# Patient Record
Sex: MALE | Race: WHITE | NOT HISPANIC OR LATINO | Employment: FULL TIME | URBAN - METROPOLITAN AREA
[De-identification: names, ages, dates, MRNs, and addresses within clinical notes are randomized per-mention and may not be internally consistent; named-entity substitution may affect disease eponyms.]

---

## 2017-05-19 ENCOUNTER — ALLSCRIPTS OFFICE VISIT (OUTPATIENT)
Dept: OTHER | Facility: OTHER | Age: 47
End: 2017-05-19

## 2017-05-19 DIAGNOSIS — Z00.00 ENCOUNTER FOR GENERAL ADULT MEDICAL EXAMINATION WITHOUT ABNORMAL FINDINGS: ICD-10-CM

## 2018-01-13 VITALS
HEART RATE: 93 BPM | DIASTOLIC BLOOD PRESSURE: 68 MMHG | OXYGEN SATURATION: 96 % | BODY MASS INDEX: 30.66 KG/M2 | HEIGHT: 71 IN | SYSTOLIC BLOOD PRESSURE: 114 MMHG | WEIGHT: 219 LBS | TEMPERATURE: 98.3 F

## 2018-01-13 NOTE — PROGRESS NOTES
Plan  Health Maintenance    · (1) BASIC METABOLIC PROFILE; Status:Active; Requested for:19May2017;    · (1) CBC/PLT/DIFF; Status:Active; Requested for:19May2017;    · (1) HEPATIC FUNCTION PANEL; Status:Active; Requested for:19May2017;    · (1) LIPID PANEL FASTING W DIRECT LDL REFLEX; Status:Active; Requested  for:19May2017;    · (1) URINALYSIS (will reflex a microscopy if leukocytes, occult blood, protein or nitrites  are not within normal limits); Status:Active; Requested for:19May2017;   Laceration of right hand without foreign body, initial encounter    · Tdap (Adacel)    Discussion/Summary  Impression: health maintenance visit  Currently, he eats a healthy diet  Prostate cancer screening: PSA is not indicated  Screening lab work includes glucose, lipid profile and urinalysis  The immunizations are needed  Patient discussion: discussed with the patient  T Tdap to the laceration history  Screening labs including cholesterol  Follow-up yearly  The patient was counseled regarding  Possible side effects of new medications were reviewed with the patient/guardian today  The treatment plan was reviewed with the patient/guardian  The patient/guardian understands and agrees with the treatment plan      History of Present Illness  HM, Adult Male: The patient is being seen for a health maintenance evaluation  The last health maintenance visit was 1 year(s) ago  Social History: Household members include spouse and 2 son(s)  He is   Work status: working full time  The patient is a current cigarette smoker  He reports occasional alcohol use  The patient has no concerns about alcohol abuse  He has never used illicit drugs  General Health:   Lifestyle:  He does not have a healthy diet  He does not have any weight concerns  He does not exercise regularly  He uses tobacco  He consumes alcohol  He denies drug use  Screening: cancer screening reviewed and updated  metabolic screening reviewed and updated  risk screening reviewed and updated  HPI: A 67-year-old for yearly checkup  He feels well  He does get frequent lacerations at his job and T Tdap should be done  Review of Systems    Over the past 2 weeks, how often have you been bothered by the following problems? 1 ) Little interest or pleasure in doing things? Not at all    2 ) Feeling down, depressed or hopeless? Not at all    3 ) Trouble falling asleep or sleeping too much? Not at all    4 ) Feeling tired or having little energy? Not at all    5 ) Poor appetite or overeating? Not at all    6 ) Feeling bad about yourself, or that you are a failure, or have let yourself or your family down? Not at all    7 ) Trouble concentrating on things, such as reading a newspaper or watching television? Not at all    8 ) Moving or speaking so slowly that other people could have noticed, or the opposite, moving or speaking faster than usual? Not at all    9 ) Thoughts that you would be better off dead or of hurting yourself in some way? Not at all  Score 0      Active Problems    1  Allergic rhinitis, seasonal (477 9) (J30 2)   2  Herpes genitalis in men (054 10) (A60 02)   3  Smoking addiction (305 1) (F17 200)    Family History  Mother    · Family history of High cholesterol (272 0) (E78 00)   · Family history of Osteoporosis (733 00) (M81 0)   · Family history of Thyroid disease (246 9) (E07 9)  Father    · Family history of Stroke (434 91) (I63 9)    Social History    · Cigarette smoker (305 1) (F17 210)   · Employed as    · Heterosexual   · Minimum alcohol consumption   · No drug use   · Smoking addiction (305 1) (F17 200)    Current Meds   1  Allegra Allergy 60 MG Oral Tablet Recorded    Allergies    1   No Known Drug Allergies    Vitals   Recorded: [id number] 03:30PM   Temperature 98 3 F   Heart Rate 93   Systolic 769   Diastolic 68   Height 5 ft 11 in   Weight 219 lb    BMI Calculated 30 54   BSA Calculated 2 19   O2 Saturation 96     Physical Exam    Constitutional   General appearance: No acute distress, well appearing and well nourished  Pulmonary   Respiratory effort: No increased work of breathing or signs of respiratory distress  Auscultation of lungs: Clear to auscultation  Cardiovascular   Auscultation of heart: Normal rate and rhythm, normal S1 and S2, without murmurs  Examination of extremities for edema and/or varicosities: Normal     Lymphatic   Palpation of lymph nodes in neck: No lymphadenopathy  Musculoskeletal   Gait and station: Normal     Skin   Skin and subcutaneous tissue: Normal without rashes or lesions  Neurologic   Reflexes: 2+ and symmetric  Sensation: No sensory loss  Psychiatric   Orientation to person, place and time: Normal     Mood and affect: Normal        Future Appointments    Date/Time Provider Specialty Site   05/21/2018 08:00 AM GO Hanson   Internal [address] [place] [address]     Signatures   Electronically signed by : GO Cantrell ; May 20 2017  1:34PM EST                       (Author)

## 2018-01-15 NOTE — PROGRESS NOTES
Assessment    1  Allergic rhinitis, seasonal (477 9) (J30 2)   2  Herpes genitalis in men (054 10) (A60 9)   3  Cigarette smoker (305 1) (F17 210)   4  Employed as    5  Heterosexual   6  Minimum alcohol consumption   7  No drug use    Plan  Health Maintenance    · (1) CBC/ PLT (NO DIFF); Status:Active; Requested for:15Jan2016;    · (1) COMPREHENSIVE METABOLIC PANEL; Status:Active; Requested for:15Jan2016;    · (1) LIPID PANEL FASTING W DIRECT LDL REFLEX; Status:Active; Requested  for:15Jan2016;    · (1) LYME ANTIBODY PROFILE W/REFLEX TO WESTERN BLOT; Status:Active; Requested  for:15Jan2016;    · (1) URINALYSIS (will reflex a microscopy if leukocytes, occult blood, protein or nitrites are  not within normal limits); Status:Active; Requested for:15Jan2016; Herpes genitalis in men    · ValACYclovir HCl - 500 MG Oral Tablet; TAKE 1 TABLET DAILY   · (1) CHRONIC HEPATITIS PANEL; Status:Active; Requested for:15Jan2016;    · (1) HIV AG/AB COMBO, 4TH GEN; [Do Not Release]; Status:Active; Requested  for:15Jan2016;    · (1) RPR; Status:Active; Requested [id number];     Discussion/Summary  Discussion Summary: You appear to have recurrent/chronic genital herpes  The appropriate treatment his valacyclovir 500 mg daily for suppression  Testing that should be done should be for blood count, kidney function, liver function,  Additionally, due to the herpes infection, you should be checked for other STDs including hepatitis, HIV, and syphilis  Followup yearly  Nonsmoking is recommended  Counseling Documentation With Imm: The patient was counseled regarding instructions for management  Smoking Cessation: Impression: tobacco smoking  Treatment plan includes tobacco cessation counseling  Patient discussion: discussed with the patient  History of Present Illness  HPI: Primary care visit  The patient has had recurrent genital herpes for 4 years   The initial presentation or warts and had cryosurgery but now he has recurrent crops of small ulcerations  These have become chronic to the point where they're always present; he's never been treated with anything other than the initial procedure  No history of other STDs  Lifetime number of sexual partners 15, all women  To the best of his knowledge none IVDU    Seasonal allergies treated intermittently  He would like routine laboratory testing and also a Lyme disease test because she's had numerous tick bites  No active health problems  No surgeries  Benign family history with no history of premature malignancy  He does smoke 1/2-1/3 pack of cigarettes a day  Genital Herpes Simplex (Brief): The patient is being seen for an initial evaluation of genital herpes simplex  The patient presents with complaints of constant episodes of mild genital skin lesions  Episodes started about 4 years ago  Symptoms are unchanged  No associated symptoms are reported  The patient is not currently being treated for this problem  Genital herpes outbreaks first began 4 year(s) ago  Pertinent medical history:  no oral herpes, no herpes zoster, no hepatitis, no syphilis, no chancroid, no chlamydia, no gonorrhea, no HIV infection, no neoplasm and no impaired immunity  He is sexually active  For contraception, he uses condoms  He was previously evaluated by a colleague 4 year(s) ago  Previous presentation included genital lesions and warts  Past treatment has included cryosurgery  Rhinitis (Brief): The patient is being seen for a routine clinic follow-up of rhinitis  Symptoms include:  nasal congestion  Specific environmental allergens have not been identified  No associated symptoms are reported  Current treatment includes nonsedating antihistamines  By report, there is good compliance with treatment, good tolerance of treatment and fair symptom control  Review of Systems  Complete-Male:   Constitutional: not feeling poorly and not feeling tired     Eyes: no purulent discharge from the eyes  ENT: nasal discharge  Cardiovascular: no chest pain  Respiratory: no cough and no shortness of breath during exertion  Gastrointestinal: no abdominal pain, no nausea, no vomiting and no diarrhea  Genitourinary: nocturia, but no dysuria  Musculoskeletal: no arthralgias and no joint swelling  Integumentary: as noted in HPI  Neurological: no headache and no fainting  Psychiatric: no anxiety and no depression  Hematologic/Lymphatic: no swollen glands, no tendency for easy bleeding and no tendency for easy bruising  Family History    1  Family history of High cholesterol (272 0) (E78 0)   2  Family history of Osteoporosis (733 00) (M81 0)   3  Family history of Thyroid disease (246 9) (E07 9)    4  Family history of Stroke (434 91) (I63 9)    Social History    · Cigarette smoker (305 1) (F17 210)   · Employed as    · Heterosexual   · Minimum alcohol consumption   · No drug use    Current Meds   1  Allegra Allergy 60 MG Oral Tablet Recorded    Allergies    1  No Known Drug Allergies    Vitals  Signs [Data Includes: Current Encounter]   Recorded: [id number] 04:59PM   Heart Rate: 78  Respiration: 12  Systolic: 295  Diastolic: 76  Height: 5 ft 11 in  Weight: 210 lb 4 00 oz  BMI Calculated: 29 32  BSA Calculated: 2 15    Physical Exam    Constitutional   General appearance: No acute distress, well appearing and well nourished  Eyes   Conjunctiva and lids: No erythema, swelling or discharge  Pupils and irises: Equal, round, reactive to light  Ophthalmoscopic examination: Normal fundi and optic discs  Ears, Nose, Mouth, and Throat   External inspection of ears and nose: Normal     Hearing: Normal     Nasal mucosa, septum, and turbinates: Normal without edema or erythema  Lips, teeth, and gums: Normal, good dentition  Oropharynx: Normal with no erythema, edema, exudate or lesions  Neck   Neck: Supple, symmetric, trachea midline, no masses      Thyroid: Normal, no thyromegaly  Pulmonary   Respiratory effort: No increased work of breathing or signs of respiratory distress  Percussion of chest: Normal     Palpation of chest: Normal     Auscultation of lungs: Clear to auscultation  Cardiovascular   Palpation of heart: Normal PMI, no thrills  Auscultation of heart: Normal rate and rhythm, normal S1 and S2, no murmurs  Carotid pulses: 2+ bilaterally  Abdominal aorta: Normal     Femoral pulses: 2+ bilaterally  Pedal pulses: 2+ bilaterally  Examination of extremities for edema and/or varicosities: Normal     Chest   Breasts: Normal, no dimpling or skin changes appreciated  Palpation of breasts and axillae: Normal, no masses palpated  Chest: Normal     Abdomen   Abdomen: Non-tender, no masses  Liver and spleen: No hepatomegaly or splenomegaly  Examination for hernias: No hernias appreciated  Genitourinary   Scrotal contents: Normal testes, no masses  Penis: Normal, no lesions  Lymphatic   Palpation of lymph nodes in neck: No lymphadenopathy  Palpation of lymph nodes in axillae: No lymphadenopathy  Palpation of lymph nodes in groin: No lymphadenopathy  Palpation of lymph nodes in other areas: No lymphadenopathy  Musculoskeletal   Gait and station: Normal     Inspection/palpation of digits and nails: Normal without clubbing or cyanosis  Inspection/palpation of joints, bones, and muscles: Normal     Range of motion: Normal     Stability: Normal     Muscle strength/tone: Normal     Skin   Skin and subcutaneous tissue: Normal without rashes or lesions  Examination of the skin for lesions: Abnormal   Scattered small 1 mm shallow ulcerations in the pudendal region and scrotum  Palpation of skin and subcutaneous tissue: Normal turgor  Neurologic   Cranial nerves: Cranial nerves 2-12 intact  Reflexes: 2+ and symmetric  Sensation: No sensory loss      Psychiatric   Judgment and insight: Normal     Orientation to person, place and time: Normal     Recent and remote memory: Intact      Mood and affect: Normal        Signatures   Electronically signed by : GO Apple ; Stanley 15 2016  5:38PM EST                       (Author)

## 2018-01-16 NOTE — MISCELLANEOUS
Dear  Alton Mitchell:      We missed you for your originally scheduled Neurological Consultation  Please call at your earliest convenience to reschedule this appointment      Sincerely,    Ivan Record [address]           Electronically signed by:Eden Webster   Oct  6 2016  4:30PM EST Co-author

## 2018-05-14 RX ORDER — FEXOFENADINE HYDROCHLORIDE 60 MG/1
TABLET, FILM COATED ORAL
COMMUNITY
End: 2018-05-15 | Stop reason: CLARIF

## 2018-05-15 ENCOUNTER — OFFICE VISIT (OUTPATIENT)
Dept: INTERNAL MEDICINE CLINIC | Facility: CLINIC | Age: 48
End: 2018-05-15
Payer: COMMERCIAL

## 2018-05-15 VITALS
DIASTOLIC BLOOD PRESSURE: 72 MMHG | OXYGEN SATURATION: 98 % | HEIGHT: 72 IN | SYSTOLIC BLOOD PRESSURE: 110 MMHG | WEIGHT: 215 LBS | HEART RATE: 88 BPM | BODY MASS INDEX: 29.12 KG/M2
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
## 2018-05-15 DIAGNOSIS — Z00.00 HEALTHCARE MAINTENANCE: Primary | ICD-10-CM

## 2018-05-15 PROCEDURE — 99396 PREV VISIT EST AGE 40-64: CPT | Performed by: INTERNAL MEDICINE

## 2018-05-15 RX ORDER — CETIRIZINE HYDROCHLORIDE 10 MG/1
10 TABLET ORAL DAILY
COMMUNITY

## 2018-05-15 NOTE — PROGRESS NOTES
Assessment/Plan:       There are no diagnoses linked to this encounter  Subjective:      Patient ID: Heidy Rick is a 51 y o  male  A 58-year-old man with no diagnoses presents for health maintenance visit  He is a nonsmoker, drinks rare alcohol, uses no illicit drugs  , sexually active with 1 partner  Reports of a bit of intermittent erectile dysfunction consistent with age but nothing too severe right now  He would like an exam for Lyme titer due to multiple tick bites  However, globally, he feels well  The following portions of the patient's history were reviewed and updated as appropriate:   He has no past medical history on file  ,   does not have a problem list on file  ,   has no past surgical history on file  ,  family history includes Hyperlipidemia in his mother; Osteoporosis in his mother; Stroke in his father; Thyroid disease in his mother  ,   reports that he has been smoking  He has never used smokeless tobacco  He reports that he drinks alcohol  He reports that he does not use drugs  ,  has No Known Allergies     Current Outpatient Prescriptions   Medication Sig Dispense Refill    cetirizine (ZyrTEC) 10 mg tablet Take 10 mg by mouth daily       No current facility-administered medications for this visit  Review of Systems   Constitutional: Negative for chills and fever  HENT: Negative for sore throat and trouble swallowing  Eyes: Negative for pain  Respiratory: Negative for cough and shortness of breath  Cardiovascular: Negative for chest pain and palpitations  Gastrointestinal: Negative for abdominal pain, blood in stool, diarrhea, nausea and vomiting  Endocrine: Negative for cold intolerance and heat intolerance  Genitourinary: Negative for dysuria, frequency and testicular pain  Musculoskeletal: Negative for joint swelling  Allergic/Immunologic: Negative for immunocompromised state     Neurological: Negative for dizziness, syncope and headaches  Hematological: Negative for adenopathy  Does not bruise/bleed easily  Psychiatric/Behavioral: Negative for dysphoric mood  The patient is not nervous/anxious  Objective:  Vitals:    05/15/18 1502   BP: 110/72   Pulse: 88   SpO2: 98%      Physical Exam   Constitutional: He is oriented to person, place, and time  He appears well-developed and well-nourished  HENT:   Head: Normocephalic and atraumatic  Eyes: Conjunctivae are normal  Pupils are equal, round, and reactive to light  Neck: Normal range of motion  No thyromegaly present  Cardiovascular: Normal rate, regular rhythm and normal heart sounds  No murmur heard  Pulmonary/Chest: Effort normal  No respiratory distress  He has no wheezes  He has no rales  Abdominal: Soft  There is no tenderness  Genitourinary: Penis normal  Right testis shows no mass and no tenderness  Left testis shows no mass and no tenderness  Musculoskeletal: Normal range of motion  He exhibits no deformity  Lymphadenopathy:     He has no cervical adenopathy  Neurological: He is alert and oriented to person, place, and time  Skin: Skin is warm and dry  Psychiatric: He has a normal mood and affect   His behavior is normal  Judgment and thought content normal

## 2018-05-18 ENCOUNTER — APPOINTMENT (OUTPATIENT)
Dept: LAB | Facility: HOSPITAL | Age: 48
End: 2018-05-18
Attending: INTERNAL MEDICINE
Payer: COMMERCIAL

## 2018-05-18 DIAGNOSIS — Z00.00 HEALTHCARE MAINTENANCE: ICD-10-CM
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
## 2018-05-18 LAB
ALBUMIN SERPL BCP-MCNC: 3.7 G/DL (ref 3.5–5)
ALP SERPL-CCNC: 69 U/L (ref 46–116)
ALT SERPL W P-5'-P-CCNC: 36 U/L (ref 12–78)
ANION GAP SERPL CALCULATED.3IONS-SCNC: 4 MMOL/L (ref 4–13)
AST SERPL W P-5'-P-CCNC: 20 U/L (ref 5–45)
BASOPHILS # BLD AUTO: 0.07 THOUSANDS/ΜL (ref 0–0.1)
BASOPHILS NFR BLD AUTO: 1 % (ref 0–1)
BILIRUB SERPL-MCNC: 0.5 MG/DL (ref 0.2–1)
BILIRUB UR QL STRIP: NEGATIVE
BUN SERPL-MCNC: 13 MG/DL (ref 5–25)
CALCIUM SERPL-MCNC: 9.1 MG/DL (ref 8.3–10.1)
CHLORIDE SERPL-SCNC: 106 MMOL/L (ref 100–108)
CHOLEST SERPL-MCNC: 204 MG/DL (ref 50–200)
CLARITY UR: CLEAR
CO2 SERPL-SCNC: 30 MMOL/L (ref 21–32)
COLOR UR: YELLOW
CREAT SERPL-MCNC: 1.01 MG/DL (ref 0.6–1.3)
EOSINOPHIL # BLD AUTO: 0.25 THOUSAND/ΜL (ref 0–0.61)
EOSINOPHIL NFR BLD AUTO: 4 % (ref 0–6)
ERYTHROCYTE [DISTWIDTH] IN BLOOD BY AUTOMATED COUNT: 12.4 % (ref 11.6–15.1)
GFR SERPL CREATININE-BSD FRML MDRD: 87 ML/MIN/1.73SQ M
GLUCOSE P FAST SERPL-MCNC: 95 MG/DL (ref 65–99)
GLUCOSE UR STRIP-MCNC: NEGATIVE MG/DL
HCT VFR BLD AUTO: 43.5 % (ref 36.5–49.3)
HDLC SERPL-MCNC: 38 MG/DL (ref 40–60)
HGB BLD-MCNC: 14.5 G/DL (ref 12–17)
HGB UR QL STRIP.AUTO: NEGATIVE
IMM GRANULOCYTES # BLD AUTO: 0.02 THOUSAND/UL (ref 0–0.2)
IMM GRANULOCYTES NFR BLD AUTO: 0 % (ref 0–2)
KETONES UR STRIP-MCNC: NEGATIVE MG/DL
LDLC SERPL CALC-MCNC: 152 MG/DL (ref 0–100)
LEUKOCYTE ESTERASE UR QL STRIP: NEGATIVE
LYMPHOCYTES # BLD AUTO: 2.35 THOUSANDS/ΜL (ref 0.6–4.47)
LYMPHOCYTES NFR BLD AUTO: 34 % (ref 14–44)
MCH RBC QN AUTO: 28 PG (ref 26.8–34.3)
MCHC RBC AUTO-ENTMCNC: 33.3 G/DL (ref 31.4–37.4)
MCV RBC AUTO: 84 FL (ref 82–98)
MONOCYTES # BLD AUTO: 0.63 THOUSAND/ΜL (ref 0.17–1.22)
MONOCYTES NFR BLD AUTO: 9 % (ref 4–12)
NEUTROPHILS # BLD AUTO: 3.63 THOUSANDS/ΜL (ref 1.85–7.62)
NEUTS SEG NFR BLD AUTO: 52 % (ref 43–75)
NITRITE UR QL STRIP: NEGATIVE
NRBC BLD AUTO-RTO: 0 /100 WBCS
PH UR STRIP.AUTO: 6 [PH] (ref 4.5–8)
PLATELET # BLD AUTO: 208 THOUSANDS/UL (ref 149–390)
PMV BLD AUTO: 10 FL (ref 8.9–12.7)
POTASSIUM SERPL-SCNC: 4.2 MMOL/L (ref 3.5–5.3)
PROT SERPL-MCNC: 7.3 G/DL (ref 6.4–8.2)
PROT UR STRIP-MCNC: NEGATIVE MG/DL
RBC # BLD AUTO: 5.18 MILLION/UL (ref 3.88–5.62)
SODIUM SERPL-SCNC: 140 MMOL/L (ref 136–145)
SP GR UR STRIP.AUTO: 1.02 (ref 1–1.03)
T4 FREE SERPL-MCNC: 0.91 NG/DL (ref 0.76–1.46)
TRIGL SERPL-MCNC: 71 MG/DL
TSH SERPL DL<=0.05 MIU/L-ACNC: 4.8 UIU/ML (ref 0.36–3.74)
UROBILINOGEN UR QL STRIP.AUTO: 0.2 E.U./DL
WBC # BLD AUTO: 6.95 THOUSAND/UL (ref 4.31–10.16)

## 2018-05-18 PROCEDURE — 36415 COLL VENOUS BLD VENIPUNCTURE: CPT

## 2018-05-18 PROCEDURE — 80053 COMPREHEN METABOLIC PANEL: CPT

## 2018-05-18 PROCEDURE — 81003 URINALYSIS AUTO W/O SCOPE: CPT | Performed by: INTERNAL MEDICINE

## 2018-05-18 PROCEDURE — 86618 LYME DISEASE ANTIBODY: CPT

## 2018-05-18 PROCEDURE — 85025 COMPLETE CBC W/AUTO DIFF WBC: CPT

## 2018-05-18 PROCEDURE — 80061 LIPID PANEL: CPT

## 2018-05-18 PROCEDURE — 84439 ASSAY OF FREE THYROXINE: CPT

## 2018-05-18 PROCEDURE — 84443 ASSAY THYROID STIM HORMONE: CPT

## 2018-05-21 LAB
B BURGDOR IGG SER IA-ACNC: 0.11
B BURGDOR IGM SER IA-ACNC: 0.14

## 2018-07-24 ENCOUNTER — TELEPHONE (OUTPATIENT)
Dept: INTERNAL MEDICINE CLINIC | Facility: CLINIC | Age: 48
End: 2018-07-24

## 2018-07-24 DIAGNOSIS — E78.5 HYPERLIPIDEMIA, UNSPECIFIED HYPERLIPIDEMIA TYPE: Primary | ICD-10-CM

## 2018-07-24 RX ORDER — ATORVASTATIN CALCIUM 20 MG/1
20 TABLET, FILM COATED ORAL DAILY
Qty: 90 TABLET | Refills: 3 | Status: SHIPPED | OUTPATIENT
Start: 2018-07-24 | End: 2020-09-04

## 2018-07-24 NOTE — TELEPHONE ENCOUNTER
PT  CARLIN  HAD  BLOOD WORK DONE  IN MAY   SAW  ON MY CHART  THAT  HIS  LABS  WERE HIGH  SAID  HE  NEVER  GOT  A CALL ABOUT  THEM  ASKING  TO SPEAK WITH DR Dahiana Hamilton PT  [phone]

## 2018-11-20 ENCOUNTER — OFFICE VISIT (OUTPATIENT)
Dept: INTERNAL MEDICINE CLINIC | Facility: CLINIC | Age: 48
End: 2018-11-20
Payer: COMMERCIAL

## 2018-11-20 VITALS
DIASTOLIC BLOOD PRESSURE: 80 MMHG | HEIGHT: 71 IN | HEART RATE: 69 BPM | OXYGEN SATURATION: 98 % | SYSTOLIC BLOOD PRESSURE: 122 MMHG | BODY MASS INDEX: 30.15 KG/M2 | WEIGHT: 215.4 LBS

## 2018-11-20 DIAGNOSIS — E03.8 SUBCLINICAL HYPOTHYROIDISM: Primary | ICD-10-CM

## 2018-11-20 DIAGNOSIS — Z12.11 COLON CANCER SCREENING: ICD-10-CM

## 2018-11-20 DIAGNOSIS — Z00.00 HEALTHCARE MAINTENANCE: ICD-10-CM

## 2018-11-20 PROBLEM — Z72.0 TOBACCO ABUSE: Status: ACTIVE | Noted: 2018-11-20

## 2018-11-20 PROCEDURE — 3008F BODY MASS INDEX DOCD: CPT | Performed by: INTERNAL MEDICINE

## 2018-11-20 PROCEDURE — 99213 OFFICE O/P EST LOW 20 MIN: CPT | Performed by: INTERNAL MEDICINE

## 2018-11-20 NOTE — PATIENT INSTRUCTIONS
The major risks your health is year smoking  Recheck thyroid and lipids  Follow-up for next birthday  Colon screening recommended at the age of 38 now so I will refer you to Dr Margarita Reddy

## 2018-11-20 NOTE — PROGRESS NOTES
Assessment/Plan:       Diagnoses and all orders for this visit:    Subclinical hypothyroidism  -     T3, free; Future  -     TSH, 3rd generation with Free T4 reflex; Future  -     T4, free; Future  -     Thyroid Antibodies Panel; Future  -     Lipid Panel with Direct LDL reflex; Future    Colon cancer screening  -     Ambulatory referral to Gastroenterology; Future    Healthcare maintenance          There are no Patient Instructions on file for this visit  Subjective:      Patient ID: Chip Thacker is a 51 y o  male  An asymptomatic 24-year-old male is concerned about his TSH  Value was 4 7  He has no symptoms or signs to suggest hypothyroidism  Likewise, lipids were modestly elevated  Using the [address] puts his 10 year risk at about 5-6%  Smokes about a quarter pack a day  The following portions of the patient's history were reviewed and updated as appropriate:   He has no past medical history on file  ,   does not have any pertinent problems on file  ,   has no past surgical history on file  ,  family history includes Hyperlipidemia in his mother; Osteoporosis in his mother; Stroke in his father; Thyroid disease in his mother  ,   reports that he has been smoking  He has a 15 00 pack-year smoking history  He has never used smokeless tobacco  He reports that he drinks alcohol  He reports that he does not use drugs  ,  has No Known Allergies     Current Outpatient Prescriptions   Medication Sig Dispense Refill    atorvastatin (LIPITOR) 20 mg tablet Take 1 tablet (20 mg total) by mouth daily for 90 days 90 tablet 3    cetirizine (ZyrTEC) 10 mg tablet Take 10 mg by mouth daily       No current facility-administered medications for this visit  Review of Systems   Constitutional: Negative for fatigue  Respiratory: Negative for cough, shortness of breath and wheezing  Endocrine: Negative for cold intolerance and heat intolerance     Musculoskeletal: Negative for joint swelling  Psychiatric/Behavioral: Negative for decreased concentration  Objective:  Vitals:    11/20/18 1251   BP: 122/80   Pulse: 69   SpO2: 98%      Physical Exam   Constitutional: He appears well-developed and well-nourished  No distress  Cardiovascular: Normal rate  Pulmonary/Chest: Effort normal    Neurological: He displays normal reflexes  Psychiatric: He has a normal mood and affect   Judgment normal

## 2020-09-02 ENCOUNTER — TELEPHONE (OUTPATIENT)
Dept: INTERNAL MEDICINE CLINIC | Facility: CLINIC | Age: 50
End: 2020-09-02

## 2020-09-03 RX ORDER — IBUPROFEN 200 MG
TABLET ORAL EVERY 6 HOURS PRN
COMMUNITY

## 2020-09-04 ENCOUNTER — OFFICE VISIT (OUTPATIENT)
Dept: INTERNAL MEDICINE CLINIC | Facility: CLINIC | Age: 50
End: 2020-09-04
Payer: COMMERCIAL

## 2020-09-04 VITALS
OXYGEN SATURATION: 97 % | DIASTOLIC BLOOD PRESSURE: 60 MMHG | HEIGHT: 74 IN | BODY MASS INDEX: 27.44 KG/M2 | WEIGHT: 213.8 LBS | SYSTOLIC BLOOD PRESSURE: 106 MMHG | TEMPERATURE: 97.6 F | HEART RATE: 74 BPM

## 2020-09-04 DIAGNOSIS — E78.5 HYPERLIPIDEMIA, UNSPECIFIED HYPERLIPIDEMIA TYPE: ICD-10-CM

## 2020-09-04 DIAGNOSIS — Z72.0 TOBACCO ABUSE: Primary | ICD-10-CM

## 2020-09-04 DIAGNOSIS — Z12.11 SCREENING FOR COLON CANCER: ICD-10-CM

## 2020-09-04 DIAGNOSIS — Z00.00 HEALTHCARE MAINTENANCE: ICD-10-CM

## 2020-09-04 PROCEDURE — 99396 PREV VISIT EST AGE 40-64: CPT | Performed by: PHYSICIAN ASSISTANT

## 2020-09-04 NOTE — PROGRESS NOTES
Assessment/Plan: for annual follow-up he feels well has no complaints  I urged him to quit smoking  Screening labs as below       Diagnoses and all orders for this visit:    Tobacco abuse    Hyperlipidemia, unspecified hyperlipidemia type  -     CBC and differential; Future  -     Comprehensive metabolic panel; Future  -     Hemoglobin A1C; Future  -     Lipid panel; Future  -     UA w Reflex to Microscopic w Reflex to Culture  -     TSH, 3rd generation; Future  -     PSA, Total Screen; Future  -     HIV 1/2 Antigen/Antibody (4th Generation) w Reflex [facility]; Future  -     Hepatitis C antibody; Future  -     Ambulatory referral to Gastroenterology; Future    Healthcare maintenance  -     CBC and differential; Future  -     Comprehensive metabolic panel; Future  -     Hemoglobin A1C; Future  -     Lipid panel; Future  -     UA w Reflex to Microscopic w Reflex to Culture  -     TSH, 3rd generation; Future  -     PSA, Total Screen; Future  -     HIV 1/2 Antigen/Antibody (4th Generation) w Reflex [facility]; Future  -     Hepatitis C antibody; Future  -     Ambulatory referral to Gastroenterology; Future    Screening for colon cancer  -     Ambulatory referral to Gastroenterology; Future    Other orders  -     ibuprofen (MOTRIN) 200 mg tablet; Take by mouth every 6 (six) hours as needed for mild pain        No problem-specific Assessment & Plan notes found for this encounter  BMI Counseling: Body mass index is 27 45 kg/m²  The BMI is above normal  Nutrition recommendations include decreasing portion sizes  Exercise recommendations include exercising 3-5 times per week  Tobacco Cessation Counseling: Tobacco cessation counseling was provided  The patient is sincerely urged to quit consumption of tobacco  He is ready to quit tobacco  Medication options discussed  Subjective:      Patient ID: Vanesa Mcghee is a 45 y o  male  He is here for  Medical follow-up    He feels well has no complaints 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  A statin has been prescribed for him in the past but he never took it  A cigarette smoker he has tried to quit many times      The following portions of the patient's history were reviewed and updated as appropriate:   He has no past medical history on file  ,  does not have any pertinent problems on file  ,   has no past surgical history on file  ,  family history includes Hyperlipidemia in his mother; Osteoporosis in his mother; Stroke in his father; Thyroid disease in his mother  ,   reports that he has been smoking  He started smoking about 33 years ago  He has a 15 00 pack-year smoking history  He has never used smokeless tobacco  He reports current alcohol use  He reports that he does not use drugs  ,  has No Known Allergies     Current Outpatient Medications   Medication Sig Dispense Refill    cetirizine (ZyrTEC) 10 mg tablet Take 10 mg by mouth daily      ibuprofen (MOTRIN) 200 mg tablet Take by mouth every 6 (six) hours as needed for mild pain       No current facility-administered medications for this visit  Review of Systems   Constitutional: Negative for activity change, appetite change, chills, diaphoresis, fatigue, fever and unexpected weight change  HENT: Negative for congestion, dental problem, drooling, ear discharge, ear pain, facial swelling, hearing loss, nosebleeds, postnasal drip, rhinorrhea, sinus pressure, sinus pain, sneezing, sore throat, tinnitus, trouble swallowing and voice change  Eyes: Negative for photophobia, pain, discharge, redness, itching and visual disturbance  Respiratory: Negative for apnea, cough, choking, chest tightness, shortness of breath, wheezing and stridor  Cardiovascular: Negative for chest pain, palpitations and leg swelling  Gastrointestinal: Negative for abdominal distention, abdominal pain, anal bleeding, blood in stool, constipation, diarrhea, nausea, rectal pain and vomiting     Endocrine: Negative for cold intolerance, heat intolerance, polydipsia, polyphagia and polyuria  Genitourinary: Negative for decreased urine volume, difficulty urinating, dysuria, enuresis, flank pain, frequency, genital sores, hematuria and urgency  Musculoskeletal: Negative for arthralgias, back pain, gait problem, joint swelling, myalgias, neck pain and neck stiffness  Skin: Negative for color change, pallor, rash and wound  Neurological: Negative for dizziness, tremors, seizures, syncope, facial asymmetry, speech difficulty, weakness, light-headedness, numbness and headaches  Hematological: Negative for adenopathy  Does not bruise/bleed easily  Psychiatric/Behavioral: Negative for agitation, behavioral problems, confusion, decreased concentration, dysphoric mood, hallucinations, self-injury, sleep disturbance and suicidal ideas  The patient is not nervous/anxious and is not hyperactive  Objective:  Vitals:    09/04/20 1519   BP: 106/60   BP Location: Left arm   Patient Position: Sitting   Cuff Size: Standard   Pulse: 74   Temp: 97 6 °F (36 4 °C)   SpO2: 97%   Weight: 97 kg (213 lb 12 8 oz)   Height: 6' 2" (1 88 m)     Body mass index is 27 45 kg/m²  Physical Exam  Vitals signs reviewed  Constitutional:       Appearance: Normal appearance  He is well-developed  HENT:      Head: Normocephalic  Right Ear: Tympanic membrane and external ear normal       Left Ear: Tympanic membrane and external ear normal       Nose: Nose normal       Mouth/Throat:      Mouth: Mucous membranes are moist    Eyes:      Conjunctiva/sclera: Conjunctivae normal       Pupils: Pupils are equal, round, and reactive to light  Neck:      Musculoskeletal: Normal range of motion and neck supple  Thyroid: No thyromegaly  Vascular: No carotid bruit  Cardiovascular:      Rate and Rhythm: Normal rate and regular rhythm  Pulses: Normal pulses  Heart sounds: Normal heart sounds  No murmur     Pulmonary:      Effort: Pulmonary effort is normal  No respiratory distress  Breath sounds: Normal breath sounds  No wheezing or rales  Abdominal:      General: Abdomen is flat  Bowel sounds are normal  There is no distension  Palpations: Abdomen is soft  There is no mass  Tenderness: There is no abdominal tenderness  There is no guarding or rebound  Hernia: No hernia is present  Musculoskeletal: Normal range of motion  General: No swelling  Lymphadenopathy:      Cervical: No cervical adenopathy  Skin:     General: Skin is warm and dry  Neurological:      General: No focal deficit present  Mental Status: He is alert and oriented to person, place, and time  Cranial Nerves: No cranial nerve deficit  Sensory: No sensory deficit  Motor: No weakness  Coordination: Coordination normal       Gait: Gait normal       Deep Tendon Reflexes: Reflexes are normal and symmetric  Reflexes normal    Psychiatric:         Mood and Affect: Mood normal          Thought Content:  Thought content normal          Judgment: Judgment normal

## 2020-09-12 ENCOUNTER — APPOINTMENT (OUTPATIENT)
Dept: LAB | Facility: CLINIC | Age: 50
End: 2020-09-12
Payer: COMMERCIAL

## 2020-09-12 DIAGNOSIS — Z00.00 HEALTHCARE MAINTENANCE: ICD-10-CM

## 2020-09-12 DIAGNOSIS — E78.5 HYPERLIPIDEMIA, UNSPECIFIED HYPERLIPIDEMIA TYPE: ICD-10-CM

## 2020-09-12 LAB
ALBUMIN SERPL BCP-MCNC: 3.8 G/DL (ref 3.5–5)
ALP SERPL-CCNC: 65 U/L (ref 46–116)
ALT SERPL W P-5'-P-CCNC: 33 U/L (ref 12–78)
ANION GAP SERPL CALCULATED.3IONS-SCNC: 4 MMOL/L (ref 4–13)
AST SERPL W P-5'-P-CCNC: 18 U/L (ref 5–45)
BASOPHILS # BLD AUTO: 0.08 THOUSANDS/ΜL (ref 0–0.1)
BASOPHILS NFR BLD AUTO: 1 % (ref 0–1)
BILIRUB SERPL-MCNC: 0.5 MG/DL (ref 0.2–1)
BILIRUB UR QL STRIP: NEGATIVE
BUN SERPL-MCNC: 18 MG/DL (ref 5–25)
CALCIUM SERPL-MCNC: 9.1 MG/DL (ref 8.3–10.1)
CHLORIDE SERPL-SCNC: 109 MMOL/L (ref 100–108)
CHOLEST SERPL-MCNC: 210 MG/DL (ref 50–200)
CLARITY UR: CLEAR
CO2 SERPL-SCNC: 27 MMOL/L (ref 21–32)
COLOR UR: YELLOW
CREAT SERPL-MCNC: 1.03 MG/DL (ref 0.6–1.3)
EOSINOPHIL # BLD AUTO: 0.26 THOUSAND/ΜL (ref 0–0.61)
EOSINOPHIL NFR BLD AUTO: 4 % (ref 0–6)
ERYTHROCYTE [DISTWIDTH] IN BLOOD BY AUTOMATED COUNT: 12.7 % (ref 11.6–15.1)
EST. AVERAGE GLUCOSE BLD GHB EST-MCNC: 114 MG/DL
GFR SERPL CREATININE-BSD FRML MDRD: 84 ML/MIN/1.73SQ M
GLUCOSE P FAST SERPL-MCNC: 96 MG/DL (ref 65–99)
GLUCOSE UR STRIP-MCNC: NEGATIVE MG/DL
HBA1C MFR BLD: 5.6 %
HCT VFR BLD AUTO: 45.2 % (ref 36.5–49.3)
HCV AB SER QL: NORMAL
HDLC SERPL-MCNC: 36 MG/DL
HGB BLD-MCNC: 15 G/DL (ref 12–17)
HGB UR QL STRIP.AUTO: NEGATIVE
IMM GRANULOCYTES # BLD AUTO: 0.02 THOUSAND/UL (ref 0–0.2)
IMM GRANULOCYTES NFR BLD AUTO: 0 % (ref 0–2)
KETONES UR STRIP-MCNC: NEGATIVE MG/DL
LDLC SERPL CALC-MCNC: 158 MG/DL (ref 0–100)
LEUKOCYTE ESTERASE UR QL STRIP: NEGATIVE
LYMPHOCYTES # BLD AUTO: 2.44 THOUSANDS/ΜL (ref 0.6–4.47)
LYMPHOCYTES NFR BLD AUTO: 36 % (ref 14–44)
MCH RBC QN AUTO: 28.6 PG (ref 26.8–34.3)
MCHC RBC AUTO-ENTMCNC: 33.2 G/DL (ref 31.4–37.4)
MCV RBC AUTO: 86 FL (ref 82–98)
MONOCYTES # BLD AUTO: 0.62 THOUSAND/ΜL (ref 0.17–1.22)
MONOCYTES NFR BLD AUTO: 9 % (ref 4–12)
NEUTROPHILS # BLD AUTO: 3.38 THOUSANDS/ΜL (ref 1.85–7.62)
NEUTS SEG NFR BLD AUTO: 50 % (ref 43–75)
NITRITE UR QL STRIP: NEGATIVE
NONHDLC SERPL-MCNC: 174 MG/DL
NRBC BLD AUTO-RTO: 0 /100 WBCS
PH UR STRIP.AUTO: 7 [PH]
PLATELET # BLD AUTO: 221 THOUSANDS/UL (ref 149–390)
PMV BLD AUTO: 10.4 FL (ref 8.9–12.7)
POTASSIUM SERPL-SCNC: 4.4 MMOL/L (ref 3.5–5.3)
PROT SERPL-MCNC: 7.3 G/DL (ref 6.4–8.2)
PROT UR STRIP-MCNC: NEGATIVE MG/DL
PSA SERPL-MCNC: 1.3 NG/ML (ref 0–4)
RBC # BLD AUTO: 5.24 MILLION/UL (ref 3.88–5.62)
SODIUM SERPL-SCNC: 140 MMOL/L (ref 136–145)
SP GR UR STRIP.AUTO: 1.02 (ref 1–1.03)
TRIGL SERPL-MCNC: 81 MG/DL
TSH SERPL DL<=0.05 MIU/L-ACNC: 4.78 UIU/ML (ref 0.36–3.74)
UROBILINOGEN UR QL STRIP.AUTO: 0.2 E.U./DL
WBC # BLD AUTO: 6.8 THOUSAND/UL (ref 4.31–10.16)

## 2020-09-12 PROCEDURE — 85025 COMPLETE CBC W/AUTO DIFF WBC: CPT

## 2020-09-12 PROCEDURE — 86803 HEPATITIS C AB TEST: CPT

## 2020-09-12 PROCEDURE — 80061 LIPID PANEL: CPT

## 2020-09-12 PROCEDURE — 83036 HEMOGLOBIN GLYCOSYLATED A1C: CPT

## 2020-09-12 PROCEDURE — 87389 HIV-1 AG W/HIV-1&-2 AB AG IA: CPT

## 2020-09-12 PROCEDURE — 36415 COLL VENOUS BLD VENIPUNCTURE: CPT

## 2020-09-12 PROCEDURE — G0103 PSA SCREENING: HCPCS

## 2020-09-12 PROCEDURE — 81003 URINALYSIS AUTO W/O SCOPE: CPT | Performed by: PHYSICIAN ASSISTANT

## 2020-09-12 PROCEDURE — 84443 ASSAY THYROID STIM HORMONE: CPT

## 2020-09-12 PROCEDURE — 80053 COMPREHEN METABOLIC PANEL: CPT

## 2020-09-14 LAB — HIV 1+2 AB+HIV1 P24 AG SERPL QL IA: NORMAL

## 2020-09-22 ENCOUNTER — TELEPHONE (OUTPATIENT)
Dept: INTERNAL MEDICINE CLINIC | Facility: CLINIC | Age: 50
End: 2020-09-22

## 2020-09-22 DIAGNOSIS — E78.5 HYPERLIPIDEMIA, UNSPECIFIED HYPERLIPIDEMIA TYPE: Primary | ICD-10-CM

## 2020-09-22 RX ORDER — ATORVASTATIN CALCIUM 20 MG/1
20 TABLET, FILM COATED ORAL DAILY
Qty: 90 TABLET | Refills: 3 | Status: SHIPPED | OUTPATIENT
Start: 2020-09-22 | End: 2021-09-21

## 2020-09-22 NOTE — TELEPHONE ENCOUNTER
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

## 2020-09-22 NOTE — TELEPHONE ENCOUNTER
Pt informed of Kole message  Pt stated he was never on a cholesterol medication and want to know if one can be sent to his pharmacy?

## 2020-09-22 NOTE — TELEPHONE ENCOUNTER
All of his labs are normal except for his cholesterol which is elevated he should be taking his cholesterol pills please call

## 2020-10-15 ENCOUNTER — OFFICE VISIT (OUTPATIENT)
Dept: GASTROENTEROLOGY | Facility: CLINIC | Age: 50
End: 2020-10-15
Payer: COMMERCIAL

## 2020-10-15 VITALS
BODY MASS INDEX: 27.98 KG/M2 | SYSTOLIC BLOOD PRESSURE: 118 MMHG | DIASTOLIC BLOOD PRESSURE: 80 MMHG | WEIGHT: 218 LBS | TEMPERATURE: 97.2 F | HEIGHT: 74 IN | HEART RATE: 82 BPM

## 2020-10-15 DIAGNOSIS — Z12.11 SCREENING FOR COLON CANCER: ICD-10-CM

## 2020-10-15 DIAGNOSIS — Z00.00 HEALTHCARE MAINTENANCE: ICD-10-CM

## 2020-10-15 DIAGNOSIS — E78.5 HYPERLIPIDEMIA, UNSPECIFIED HYPERLIPIDEMIA TYPE: ICD-10-CM

## 2020-10-15 PROCEDURE — 99203 OFFICE O/P NEW LOW 30 MIN: CPT | Performed by: PHYSICIAN ASSISTANT

## 2020-10-28 ENCOUNTER — ANESTHESIA EVENT (OUTPATIENT)
Dept: GASTROENTEROLOGY | Facility: HOSPITAL | Age: 50
End: 2020-10-28

## 2020-10-28 ENCOUNTER — ANESTHESIA (OUTPATIENT)
Dept: GASTROENTEROLOGY | Facility: HOSPITAL | Age: 50
End: 2020-10-28

## 2020-10-28 ENCOUNTER — HOSPITAL ENCOUNTER (OUTPATIENT)
Dept: GASTROENTEROLOGY | Facility: HOSPITAL | Age: 50
Setting detail: OUTPATIENT SURGERY
Discharge: HOME | End: 2020-10-28
Attending: INTERNAL MEDICINE | Admitting: INTERNAL MEDICINE
Payer: COMMERCIAL

## 2020-10-28 VITALS
DIASTOLIC BLOOD PRESSURE: 72 MMHG | RESPIRATION RATE: 11 BRPM | TEMPERATURE: 97.6 F | WEIGHT: 212.96 LBS | HEIGHT: 74 IN | HEART RATE: 76 BPM | SYSTOLIC BLOOD PRESSURE: 112 MMHG | BODY MASS INDEX: 27.33 KG/M2 | OXYGEN SATURATION: 100 %

## 2020-10-28 VITALS — HEART RATE: 85 BPM

## 2020-10-28 DIAGNOSIS — Z12.11 SCREENING FOR COLON CANCER: ICD-10-CM

## 2020-10-28 PROCEDURE — 88305 TISSUE EXAM BY PATHOLOGIST: CPT | Performed by: PATHOLOGY

## 2020-10-28 PROCEDURE — 45385 COLONOSCOPY W/LESION REMOVAL: CPT | Performed by: INTERNAL MEDICINE

## 2020-10-28 RX ORDER — SODIUM CHLORIDE, SODIUM LACTATE, POTASSIUM CHLORIDE, CALCIUM CHLORIDE 600; 310; 30; 20 MG/100ML; MG/100ML; MG/100ML; MG/100ML
125 INJECTION, SOLUTION INTRAVENOUS CONTINUOUS
Status: DISCONTINUED | OUTPATIENT
Start: 2020-10-28 | End: 2020-11-01 | Stop reason: HOSPADM

## 2020-10-28 RX ORDER — PROPOFOL 10 MG/ML
INJECTION, EMULSION INTRAVENOUS AS NEEDED
Status: DISCONTINUED | OUTPATIENT
Start: 2020-10-28 | End: 2020-10-28

## 2020-10-28 RX ADMIN — PROPOFOL 30 MG: 10 INJECTION, EMULSION INTRAVENOUS at 09:49

## 2020-10-28 RX ADMIN — PROPOFOL 20 MG: 10 INJECTION, EMULSION INTRAVENOUS at 09:56

## 2020-10-28 RX ADMIN — SODIUM CHLORIDE, SODIUM LACTATE, POTASSIUM CHLORIDE, AND CALCIUM CHLORIDE 125 ML/HR: .6; .31; .03; .02 INJECTION, SOLUTION INTRAVENOUS at 09:11

## 2020-10-28 RX ADMIN — PROPOFOL 30 MG: 10 INJECTION, EMULSION INTRAVENOUS at 09:53

## 2020-10-28 RX ADMIN — PROPOFOL 100 MG: 10 INJECTION, EMULSION INTRAVENOUS at 09:46

## 2021-01-15 ENCOUNTER — TELEMEDICINE (OUTPATIENT)
Dept: INTERNAL MEDICINE CLINIC | Facility: CLINIC | Age: 51
End: 2021-01-15
Payer: COMMERCIAL

## 2021-01-15 DIAGNOSIS — B34.9 VIRAL INFECTION, UNSPECIFIED: ICD-10-CM

## 2021-01-15 DIAGNOSIS — Z20.822 EXPOSURE TO COVID-19 VIRUS: ICD-10-CM

## 2021-01-15 PROCEDURE — U0005 INFEC AGEN DETEC AMPLI PROBE: HCPCS | Performed by: NURSE PRACTITIONER

## 2021-01-15 PROCEDURE — 99214 OFFICE O/P EST MOD 30 MIN: CPT | Performed by: NURSE PRACTITIONER

## 2021-01-15 PROCEDURE — U0003 INFECTIOUS AGENT DETECTION BY NUCLEIC ACID (DNA OR RNA); SEVERE ACUTE RESPIRATORY SYNDROME CORONAVIRUS 2 (SARS-COV-2) (CORONAVIRUS DISEASE [COVID-19]), AMPLIFIED PROBE TECHNIQUE, MAKING USE OF HIGH THROUGHPUT TECHNOLOGIES AS DESCRIBED BY CMS-2020-01-R: HCPCS | Performed by: NURSE PRACTITIONER

## 2021-01-15 NOTE — PROGRESS NOTES
COVID-19 Virtual Visit     Assessment/Plan:  Patient will go for covid testing  Advised to continue quarantine until test results  Since son just tested positive will need to complete 10 day quarantine most likely  Advised that he can take tylenol or motrin for ha or fevers  Can use over the counter cold medications if desired  Problem List Items Addressed This Visit     None      Visit Diagnoses     Exposure to COVID-19 virus        Relevant Orders    Novel Coronavirus (COVID-19), PCR [facility] - Collected at [facility] or [facility]    Viral infection, unspecified        Relevant Orders    Novel Coronavirus (COVID-19), PCR [facility] - Collected at [place] [id number] or [facility]         Disposition:     I recommended self-quarantine for 10 days and to watch for symptoms until 14 days after exposure  If patient were to develop symptoms, they should self isolate and call our office for further guidance  I referred patient to one of our centralized sites for a COVID-19 swab  I have spent 10 minutes directly with the patient  Greater than 50% of this time was spent in counseling/coordination of care regarding: instructions for management, patient and family education, importance of treatment compliance, risk factor reductions and impressions  Encounter provider NEYMAR Apodaca    Provider located at [address] [place] [address]    Recent Visits  No visits were found meeting these conditions  Showing recent visits within past 7 days and meeting all other requirements     Today's Visits  Date Type Provider Dept   01/15/21 [address], [address] today's visits and meeting all other requirements     Future Appointments  No visits were found meeting these conditions     Showing future appointments within next 150 days and meeting all other requirements      This virtual check-in was done via [place] and patient was informed that this is not a secure, HIPAA-compliant platform  He agrees to proceed  Patient agrees to participate in a virtual check in via telephone or video visit instead of presenting to the office to address urgent/immediate medical needs  Patient is aware this is a billable service  After connecting through [facility], the patient was identified by name and date of birth  Colin Villaseñor was informed that this was a telemedicine visit and that the exam was being conducted confidentially over secure lines  My office door was closed  No one else was in the room  Colin Villaseñor acknowledged consent and understanding of privacy and security of the telemedicine visit  I informed the patient that I have reviewed his record in Epic and presented the opportunity for him to ask any questions regarding the visit today  The patient agreed to participate  Subjective: Colin Villaseñor is a 45 y o  male who is concerned about COVID-19  Patient's symptoms include fatigue, rhinorrhea, sore throat, cough, myalgias and headache  Patient denies fever, chills, malaise, congestion, anosmia, loss of taste, shortness of breath, chest tightness, abdominal pain, nausea, vomiting and diarrhea       Date of symptom onset: 1/14/2021  Date of exposure: 1/9/2021    Exposure:   Contact with a person who is under investigation (PUI) for or who is positive for COVID-19 within the last 14 days?: Yes    Hospitalized recently for fever and/or lower respiratory symptoms?: No      Currently a healthcare worker that is involved in direct patient care?: No      Works in a special setting where the risk of COVID-19 transmission may be high? (this may include long-term care, correctional and [facility] facilities; homeless shelters; assisted-living facilities and group homes ): No      Resident in a special setting where the risk of COVID-19 transmission may be high? (this may include long-term care, correctional and senior care facilities; homeless shelters; assisted-living facilities and group homes ): No      Patient states that his step son tested positive for covid today  Step son's father had tested positive about one week ago  Patient began feeling unwell yesterday  He works for [employer] but has been out of work this whole week due to the possible exposure  No results found for: Max Zee, EMMA, [address]  Past Medical History:   Diagnosis Date    Hyperlipidemia      Past Surgical History:   Procedure Laterality Date    EGD      WISDOM TOOTH EXTRACTION       Current Outpatient Medications   Medication Sig Dispense Refill    atorvastatin (LIPITOR) 20 mg tablet Take 1 tablet (20 mg total) by mouth daily 90 tablet 3    cetirizine (ZyrTEC) 10 mg tablet Take 10 mg by mouth daily      ibuprofen (MOTRIN) 200 mg tablet Take by mouth every 6 (six) hours as needed for mild pain       No current facility-administered medications for this visit  No Known Allergies    Review of Systems   Constitutional: Positive for fatigue  Negative for chills and fever  HENT: Positive for rhinorrhea and sore throat  Negative for congestion  Respiratory: Positive for cough  Negative for chest tightness and shortness of breath  Gastrointestinal: Negative for abdominal pain, diarrhea, nausea and vomiting  Musculoskeletal: Positive for myalgias  Neurological: Positive for headaches  Objective: There were no vitals filed for this visit  Physical Exam  Constitutional:       General: He is not in acute distress  Appearance: Normal appearance  He is well-developed and well-groomed  He is not ill-appearing  HENT:      Head: Normocephalic and atraumatic  Right Ear: Hearing normal       Left Ear: Hearing normal       Nose: Congestion present  Pulmonary:      Effort: Pulmonary effort is normal  No tachypnea or respiratory distress     Neurological:      Mental Status: He is alert and oriented to person, place, and time  Psychiatric:         Attention and Perception: Attention normal          Mood and Affect: Mood normal          Speech: Speech normal          Behavior: Behavior normal  Behavior is cooperative  Thought Content: Thought content normal        VIRTUAL VISIT DISCLAIMER    Aurelio Gaitan acknowledges that he has consented to an online visit or consultation  He understands that the online visit is based solely on information provided by him, and that, in the absence of a face-to-face physical evaluation by the physician, the diagnosis he receives is both limited and provisional in terms of accuracy and completeness  This is not intended to replace a full medical face-to-face evaluation by the physician  Aurelio Gaitan understands and accepts these terms

## 2021-01-15 NOTE — PATIENT INSTRUCTIONS
COVID-19 (Coronavirus Disease 2019)   AMBULATORY CARE:   Coronavirus disease 2019 (COVID-19)  is the disease caused by the novel (new) coronavirus first discovered in December 2019  Coronaviruses generally cause upper respiratory (nose, throat, and lung) infections, such as a cold  The new virus can also cause serious lower respiratory conditions, such as pneumonia or acute respiratory distress syndrome (ARDS)  Anyone can develop serious problems from the new virus, but your risk is higher if you are 65 or older  A weak immune system, diabetes, or a heart or lung condition can also increase your risk  Signs and symptoms: You may not develop any signs or symptoms  Signs and symptoms that do develop usually start about 5 days after infection but can take 2 to 14 days  Signs and symptoms range from mild to severe  You may feel like you have the flu or a bad cold  Information on COVID-19 is still being learned  Tell your healthcare provider if you think you were infected but develop signs or symptoms not listed below:  · A cough    · Shortness of breath or trouble breathing that may become severe    · A fever of at least 100 4°F, or 38°C (may be lower in adults 65 or older)    · Chills that might include shaking    · Muscle pain, body aches, or a headache    · A sore throat    · Suddenly not being able to taste or smell anything    · Feeling mentally and physically tired (fatigue)    · Congestion (stuffy head and nose), or a runny nose    · Diarrhea, nausea, or vomiting    If you think you or someone you know may be infected:  Do the following to protect others:  · If emergency care is needed,  tell the  about the possible infection, or call ahead and tell the emergency department  · Call a healthcare provider  for instructions if symptoms are mild  Anyone who may be infected should not  arrive without calling first  The provider will need to protect staff members and other patients      · The person who may be infected needs to wear a face covering  while getting medical care  This will help lower the risk of infecting others  Coverings are not used for anyone who is younger than 2 years, has breathing problems, or cannot remove it  The provider can give you instructions for anyone who cannot wear a covering  Call your local emergency number (911 in the [address]) or go to the emergency department if:   · You have trouble breathing or shortness of breath at rest     · You have chest pain or pressure that lasts longer than 5 minutes  · You become confused or hard to wake  · Your lips or face are blue  · You have a fever of 104°F (40°C) or higher  Call your doctor if:   · You do not  have symptoms of COVID-19 but had close physical contact within 14 days with someone who tested positive  · You have questions or concerns about your condition or care  How COVID-19 is diagnosed: If you think you have COVID-19, call your healthcare provider  In some areas, testing is only done if a person has severe symptoms or is hospitalized  Testing is done more widely in other places  Your provider will tell you what to do based on your symptoms and the rules in your area  In general, the following may be used:  · A viral test  shows if you have a current infection  Samples are taken from your nose and throat, usually with swabs  You may need to wait several days to get the test results  Your healthcare provider will tell you how to get your results  You will need to quarantine (stay physically away from others) until you get your results  If results show you have COVID-19, you will need to quarantine until you are well  Your provider or other health official may give you more directions  You will also need to prevent another infection until it is known if you can get COVID-19 again  · An antibody test  shows if you had a past infection   Blood samples are used for this test  Antibodies are made by your immune system to attack the virus that causes COVID-19  Antibodies will form 1 to 3 weeks after you are infected  It is not known if antibodies prevent a second infection, or for how long a person might be protected  If you have antibodies, you will still need to be careful around others until more is known  · CT scans or x-rays  may be used to check for signs of pneumonia  The 2019 coronavirus causes a specific kind of pneumonia, usually in both lungs  Treatment:  No medicine or specific treatment is currently approved for COVID-19  The following may be used to manage your symptoms or treat the effects of COVID-19:  · Mild symptoms  may get better on their own  If you do not need to be treated in a hospital, you will be given instructions to use at home  Your condition will be closely monitored  You will need to watch for worsening symptoms and seek immediate care if needed  Talk to your healthcare provider about the following:    ? Relieve your symptoms  To soothe a sore throat, gargle with warm salt water, or use throat lozenges or a throat spray  Your healthcare provider may recommend a cough medicine  Drink more liquids to thin and loosen mucus and to prevent dehydration  Use decongestants or saline drops as directed for nasal congestion  ? NSAIDs or acetaminophen  can help lower a fever and relieve body aches or a headache  Follow directions  If not taken correctly, NSAIDs can cause kidney damage and acetaminophen can cause liver damage  · Severe or life-threatening symptoms  are treated in the hospital  You may need a combination of the following:    ? Medicines  may be given to reduce inflammation or to fight the virus  You may also need blood thinners to prevent or treat blood clots  If you have a deep vein thrombosis (DVT) or pulmonary embolism (PE), you may need to keep using blood thinners for 3 months  ? Extra oxygen  may be given if you have respiratory failure   This means your lungs cannot get enough oxygen into your blood and out to your organs  Extra oxygen can help prevent organ failure  ? A ventilator  may be used to help you breathe  ? Convalescent plasma (part of blood)  from a patient who has recovered from COVID-19 may be used  The plasma contains antibodies that can help your body fight the infection  Convalescent plasma is only given to patients who have severe signs and symptoms  How the 2019 coronavirus spreads: The virus spreads quickly and easily  You can become infected if you are in contact with a large amount of the virus, even for a short time  You can also become infected by being around a small amount of virus for a long time  The following are ways the virus is thought to spread, but more information may be coming:  · Droplets are the most common way all coronaviruses spread  The virus can travel in droplets that form when a person talks, coughs, or sneezes  Anyone who breathes in the droplets or gets them in his or her eyes can become infected with the virus  Close personal contact with an infected person is thought to be the main way the virus spreads  Close personal contact means you are within 6 feet (2 meters) of the person  · Person-to-person contact can spread the virus  For example, a person with the virus on his or her hands can spread it by shaking hands with someone  At this time, it does not appear that the virus can be passed to a baby during pregnancy or delivery  The baby can be infected after he or she is born through person-to-person contact  The virus also does not appear to spread in breast milk  If you are pregnant or breastfeeding, talk to your healthcare provider or obstetrician about any concerns you have  · The virus can stay on objects and surfaces  A person can get the virus on his or her hands by touching the object or surface  Infection happens if the person then touches his or her eyes or mouth with unwashed hands   It is not yet known how long the virus can stay on an object or surface  That is why it is important to clean all surfaces that are used regularly  · An infected animal may be able to infect a person who touches it  This may happen at live markets or on a farm  How everyone can lower the risk for COVID-19:  The best way to prevent infection is to avoid anyone who is infected, but this can be hard to do  An infected person can spread the virus before signs or symptoms begin, or even if signs or symptoms never develop  The following can help lower the risk for infection:      · Wash your hands often throughout the day  Use soap and water  Rub your soapy hands together, lacing your fingers  Wash the front and back of each hand, and in between your fingers  Use the fingers of one hand to scrub under the fingernails of the other hand  Wash for at least 20 seconds  Rinse with warm, running water for several seconds  Then dry your hands with a clean towel or paper towel  Use hand  that contains alcohol if soap and water are not available  Do not touch your eyes, nose, or mouth without washing your hands first  Teach children how to wash their hands and use hand   · Cover a sneeze or cough  This prevents droplets from traveling from you to others  Turn your face away and cover your mouth and nose with a tissue  Throw the tissue away  Use the bend of your arm if a tissue is not available  Then wash your hands well with soap and water or use hand   Turn and cover your face if you are around someone who is sneezing or coughing  Teach children how to cover a cough or sneeze  · Follow worldwide, national, and local social distancing guidelines  Social distancing means people avoid close physical contact so the virus cannot spread from one person to another  Keep at least 6 feet (2 meters) between you and others   Also keep this distance from anyone who comes to your home, such as someone making a delivery  · Make a habit of not touching your face  It is not known how long the virus can stay on objects and surfaces  If you get the virus on your hands, you can transfer it to your eyes, nose, or mouth and become infected  You can also transfer it to objects, surfaces, or people  Be aware of what you touch when you go out  Examples include handrails and elevator buttons  Try not to touch anything with bare hands unless it is necessary  Wash your hands before you leave your home and when you return  · Clean and disinfect high-touch surfaces and objects often  Use a disinfecting solution or wipes  You can make a solution by diluting 4 teaspoons of bleach in 1 quart (4 cups) of water  Clean and disinfect even if you think no one living in or coming to your home is infected with the virus  You can wipe items with a disinfecting cloth before you bring them into your home  Wash your hands after you handle anything you bring into your home  · Make your immune system as healthy as possible  A weakened immune system makes you more vulnerable to the new coronavirus  No COVID-19 vaccine is available yet  Vaccines such as the flu and pneumonia vaccines can help your immune system  Your healthcare provider can tell you which vaccines to get, and when to get them  Keep your immune system as strong as possible  Do not smoke  Eat healthy foods, exercise regularly, and try to manage stress  Go to bed and wake up at the same times each day  Social distancing guidelines:  National and local social distancing rules vary  Rules may change over time as restrictions are lifted  Restrictions may return if an outbreak happens where you live  It is important to know and follow all current social distancing rules in your area  The following are general guidelines:  · Limit trips out of your home  You may be able to have food, medicines, and other supplies delivered   If possible, have delivered items left at your door or other area  Try not to have someone hand you an item  You will be so close to the person that the virus can spread between you  · Do not have close physical contact with anyone who does not live in your home  Do not shake hands with, hug, or kiss a person as a greeting  Stand or walk as far from others as possible  If you must use public transportation (such as a bus or subway), try to sit or stand away from others  You can stay safely connected with others through phone calls, e-mail messages, social media websites, and video chats  Check in on anyone who may be having a hard time socially distancing, or who lives alone  Ask administrators at nursing homes or long-term care facilities how you can safely communicate with someone living there  · Wear a cloth face covering around others who do not live in your home  Face coverings help prevent the virus from spreading to others in droplets  You can use a clear face covering if someone needs to read your lips  This is a cloth covering that has plastic over the mouth area so your lips can be seen  Do not use coverings that have breathing valves or vents  The virus can travel out of the valve or vent and be spread to others  Do not take your covering off to talk, cough, or sneeze  Do not use coverings on children younger than 2 years or on anyone who has breathing problems or cannot remove it  · Only allow medical or other necessary professionals into your home  Wear your face covering, and remind professionals to wear a face covering  Remind them to wash their hands when they arrive and before they leave  Do not  let anyone who does not live in your home in, even if the person is not sick  A person can pass the virus to others before symptoms of COVID-19 begin  Some people never even develop symptoms  Children commonly have mild symptoms or no symptoms  It may be hard to tell a child not to hug or kiss you   Explain that this is how he or she can help you stay healthy  · Do not go to someone else's home unless it is necessary  Do not go over to visit, even if the person is lonely  Only go if you need to help him or her  Make sure you both wear face coverings while you are there  · Avoid large gatherings and crowds  Gatherings or crowds of 10 or more individuals can cause the virus to spread  Examples of gatherings include parties, sporting events, Taoist services, and conferences  Crowds may form at beaches, [place], and tourist attractions  Protect yourself by staying away from large gatherings and crowds  · Ask your healthcare provider for other ways to have appointments  You may be able to have appointments without having to go into the provider's office  Some providers offer phone, video, or other types of appointments  You may also be able to get prescriptions for a few months of your medicines at a time  · Stay safe if you must go out to work  You may have a job that can only be done outside your home  Keep physical distance between you and other workers as much as possible  Follow your employer's rules so everyone stays safe  If you have COVID-19 and are recovering at home:  Healthcare providers will give you specific instructions to follow  The following are general guidelines to remind you how to keep others safe until you are well:  · Wash your hands often  Use soap and water as much as possible  You can use hand  that contains alcohol if soap and water are not available  Do not share towels with anyone  If you use paper towels, throw them away in a lined trash can kept in your room or area  Use a covered trash can, if possible  · Do not go out of your home unless it is necessary  You may have to go to your healthcare provider's office for check-ups or to get prescription refills  Do not arrive at the provider's office without an appointment  Providers have to make their offices safe for staff and other patients      · Do not have close physical contact with anyone unless it is necessary  Only have close physical contact with a person giving direct care, or a baby or child you must care for  Family members and friends should not visit you  If possible, stay in a separate area or room of your home if you live with others  No one should go into the area or room except to give you care  You can visit with others by phone, video chat, e-mail, or similar systems  It is important to stay connected with others in your life while you recover  · Wear a face covering while others are near you  This can help prevent droplets from spreading the virus when you talk, sneeze, or cough  Put the covering on before anyone comes into your room or area  Remind the person to cover his or her nose and mouth before going in to provide care for you  · Do not share items  Do not share dishes, towels, or other items with anyone  Items need to be washed after you use them  · Protect your baby  Wash your hands with soap and water often throughout the day  Wear a clean face covering while you breastfeed, or while you express or pump breast milk  If possible, ask someone who is well to care for your baby  You can put breast milk in bottles for the person to use, if needed  Talk to your healthcare provider if you have any questions or concerns about caring for or bonding with your baby  He or she will tell you when to bring your baby in for check-ups and vaccines  He or she will also tell you what to do if you think your baby was infected with the new virus  · Do not handle live animals  Until more is known, it is best not to touch, play with, or handle live animals  Some animals, including pets, have been infected with the new coronavirus  Do not handle or care for animals until you are well  Care includes feeding, petting, and cuddling your pet  Do not let your pet lick you or share your food   Ask someone who is not infected to take care of your pet, if possible  If you must care for a pet, wear a face covering  Wash your hands before and after you give care  · Follow directions from your healthcare provider for being around others after you recover  You will need to wait at least 10 days after symptoms first appeared  Then you will need to have no fever for 24 hours without fever medicine, and no other symptoms  A loss of taste or smell may continue for several months  It is considered okay to be around others if this is your only symptom  It is not known for sure if or for how long a recovered person can pass the virus to others  Your provider may give you instructions, such as continuing social distancing or wearing a face covering around others  How to take care of someone who has COVID-19:  If the person lives in another home, arrange for a time to give care  Remember to bring a few pairs of disposable gloves and a cloth face covering  The following are general guidelines to help you safely care for anyone who has COVID-19:  · Wash your hands often  Wash before and after you go into the person's home, area, or room  Throw paper towels away in a lined trash can that has a lid, if possible  · Do not allow others to go near the person  No one should come into the person's home unless it is necessary  If possible, the person should be in a separate area or room if he or she lives with others  Keep the room's door shut unless you need to go in or out  Have others call, video chat, or e-mail the person if he or she is feeling well enough  The person may feel lonely if he or she is kept separate for a long period of time  Safe communication can help him or her stay connected to family and friends  · Make sure the person's room has good air flow  You may be able to open the window if the weather allows  An air conditioner can also be turned on to help air move  · Contact the person before you go in to give care    Make sure the person is wearing a face covering  Remind him or her to wash his or her hands with soap and water  He or she can use hand  that contains alcohol if soap and water are not available  Put on a face covering before you go in to give care  · Wear gloves while you give care and clean  Clean items the person uses often  Clean countertops, cooking surfaces, and the fronts and insides of the microwave and refrigerator  Clean the shower, toilet, the area around the toilet, the sink, the area around the sink, and faucets  Gather used laundry or bedding  Wash and dry items on the warmest settings the fabric allows  Wash dishes and silverware in hot, soapy water or in a   · Anything you throw away needs to go into a lined trash can  When you need to empty the trash, close the open end of the lining and tie it closed  This helps prevent items the virus is on from spilling out of the trash  Remove your gloves and throw them away  Wash your hands  Follow up with your doctor as directed:  Write down your questions so you remember to ask them during your visits  For more information:   · Centers for Disease Control and Prevention  [address] , [address]  Phone: 6- 393 - 145-0721  Web Address: [employer] com br    © Copyright [address] Information is for End User's use only and may not be sold, redistributed or otherwise used for commercial purposes  All illustrations and images included in CareNotes® are the copyrighted property of A D A M [employer]  or [facility]we   The above information is an  only  It is not intended as medical advice for individual conditions or treatments  Talk to your doctor, nurse or pharmacist before following any medical regimen to see if it is safe and effective for you

## 2021-01-16 LAB — SARS-COV-2 RNA SPEC QL NAA+PROBE: NOT DETECTED

## 2021-01-18 ENCOUNTER — TELEPHONE (OUTPATIENT)
Dept: INTERNAL MEDICINE CLINIC | Facility: CLINIC | Age: 51
End: 2021-01-18

## 2021-01-18 NOTE — TELEPHONE ENCOUNTER
----- Message from Camron Lilly sent at 1/18/2021  7:56 AM EST -----  Please let patient know covid test was negative  CDC advises that if you had contact with a covid positive person you can end quarantine after 7 days with a negative test, as long as asymptomatic  If still feeling ill, should wait to end quarantine until symptoms are improving and afebrile for 24 hours without medication  Thanks

## 2021-03-26 DIAGNOSIS — Z23 ENCOUNTER FOR IMMUNIZATION: ICD-10-CM

## 2022-01-08 DIAGNOSIS — E78.5 HYPERLIPIDEMIA, UNSPECIFIED HYPERLIPIDEMIA TYPE: ICD-10-CM

## 2022-01-08 RX ORDER — ATORVASTATIN CALCIUM 20 MG/1
TABLET, FILM COATED ORAL
Qty: 90 TABLET | Refills: 0 | Status: SHIPPED | OUTPATIENT
Start: 2022-01-08 | End: 2022-02-21 | Stop reason: SDUPTHER

## 2022-02-21 DIAGNOSIS — E78.5 HYPERLIPIDEMIA, UNSPECIFIED HYPERLIPIDEMIA TYPE: ICD-10-CM

## 2022-02-22 RX ORDER — ATORVASTATIN CALCIUM 20 MG/1
20 TABLET, FILM COATED ORAL DAILY
Qty: 90 TABLET | Refills: 0 | Status: SHIPPED | OUTPATIENT
Start: 2022-02-22 | End: 2022-05-21

## 2022-06-24 ENCOUNTER — OFFICE VISIT (OUTPATIENT)
Dept: INTERNAL MEDICINE CLINIC | Facility: CLINIC | Age: 52
End: 2022-06-24
Payer: COMMERCIAL

## 2022-06-24 VITALS
WEIGHT: 207.2 LBS | SYSTOLIC BLOOD PRESSURE: 112 MMHG | OXYGEN SATURATION: 99 % | HEIGHT: 72 IN | BODY MASS INDEX: 28.06 KG/M2 | DIASTOLIC BLOOD PRESSURE: 76 MMHG | HEART RATE: 72 BPM

## 2022-06-24 DIAGNOSIS — Z12.5 SCREENING FOR PROSTATE CANCER: ICD-10-CM

## 2022-06-24 DIAGNOSIS — E78.49 OTHER HYPERLIPIDEMIA: ICD-10-CM

## 2022-06-24 DIAGNOSIS — E03.8 SUBCLINICAL HYPOTHYROIDISM: ICD-10-CM

## 2022-06-24 DIAGNOSIS — Z00.00 ADULT GENERAL MEDICAL EXAMINATION: Primary | ICD-10-CM

## 2022-06-24 PROBLEM — Z12.11 COLON CANCER SCREENING: Status: RESOLVED | Noted: 2018-11-20 | Resolved: 2022-06-24

## 2022-06-24 PROCEDURE — 99396 PREV VISIT EST AGE 40-64: CPT | Performed by: INTERNAL MEDICINE

## 2022-06-24 PROCEDURE — 4004F PT TOBACCO SCREEN RCVD TLK: CPT | Performed by: INTERNAL MEDICINE

## 2022-06-24 PROCEDURE — 3725F SCREEN DEPRESSION PERFORMED: CPT | Performed by: INTERNAL MEDICINE

## 2022-06-24 PROCEDURE — 3008F BODY MASS INDEX DOCD: CPT | Performed by: INTERNAL MEDICINE

## 2022-06-24 NOTE — PROGRESS NOTES
ADULT ANNUAL [address]     NAME: Brian Callahan  AGE: 47 y o  SEX: male  : 1970     DATE: 2022     Assessment and Plan:     Problem List Items Addressed This Visit        Endocrine    Subclinical hypothyroidism     Check updated thyroid function  No symptoms consistent with overt hypothyroidism  Relevant Orders    TSH, 3rd generation with Free T4 reflex       Other    Other hyperlipidemia     Continue statin as prescribed  Check lipids  Quit smoking  No ASCVD history  Relevant Orders    Lipid Panel with Direct LDL reflex      Other Visit Diagnoses     Adult general medical examination    -  Primary    Relevant Orders    CBC    Basic metabolic panel    Screening for prostate cancer        Relevant Orders    PSA, Total Screen        Immunizations and preventive care screenings were discussed with patient today  Appropriate education was printed on patient's after visit summary  Counseling:  Alcohol/drug use: discussed moderation in alcohol intake, the recommendations for healthy alcohol use, and avoidance of illicit drug use  Dental Health: discussed importance of regular tooth brushing, flossing, and dental visits  Injury prevention: discussed safety/seat belts, safety helmets, smoke detectors, carbon dioxide detectors, and smoking near bedding or upholstery  · Sexual health: discussed sexually transmitted diseases, partner selection, use of condoms, avoidance of unintended pregnancy, and contraceptive alternatives  BMI Counseling: Body mass index is 27 91 kg/m²  The BMI is above normal  Nutrition recommendations include moderation in carbohydrate intake and increasing intake of lean protein  Exercise recommendations include exercising 3-5 times per week  Rationale for BMI follow-up plan is due to patient being overweight or obese       Depression Screening and Follow-up Plan: Patient was screened for depression during today's encounter  They screened negative with a PHQ-2 score of 0  Tobacco Cessation Counseling: Tobacco cessation counseling was provided  The patient is sincerely urged to quit consumption of tobacco  He is not ready to quit tobacco      Return in about 1 year (around 6/25/2023) for Annual Physical      Chief Complaint:     Chief Complaint   Patient presents with    Physical Exam      History of Present Illness:     Adult Annual Physical   Patient here for a comprehensive physical exam  The patient had been seeing Dr Valarie Evans  Knows that he will be retiring in the future  I see his wife  He is generally well  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  Wife is a nurse for school district  Taking medication for high cholesterol  Hasn't had labs recently  Depression Screening  PHQ-2/9 Depression Screening    Little interest or pleasure in doing things: 0 - not at all  Feeling down, depressed, or hopeless: 0 - not at all  PHQ-2 Score: 0  PHQ-2 Interpretation: Negative depression screen       General Health  · Sleep: sleeps well  · Hearing: decreased - bilateral   · Vision: no vision problems  · Dental: regular dental visits and brushes teeth twice daily  Review of Systems:     Review of Systems   Constitutional: Negative for appetite change, chills, fatigue and fever  HENT: Negative for congestion, hearing loss, postnasal drip, rhinorrhea, sore throat, tinnitus and trouble swallowing  Eyes: Negative for pain, discharge, redness and visual disturbance  Respiratory: Negative for cough, chest tightness, shortness of breath and wheezing  Cardiovascular: Negative for chest pain, palpitations and leg swelling  Gastrointestinal: Negative for abdominal distention, abdominal pain, blood in stool, constipation, diarrhea, nausea and vomiting  Endocrine: Negative for cold intolerance, heat intolerance, polydipsia, polyphagia and polyuria     Genitourinary: Negative for difficulty urinating, dysuria, frequency, hematuria and urgency  Musculoskeletal: Negative for arthralgias, back pain, gait problem, joint swelling, myalgias, neck pain and neck stiffness  Skin: Negative for color change and rash  Neurological: Negative for dizziness, tremors, seizures, syncope, speech difficulty, weakness, light-headedness, numbness and headaches  Hematological: Negative for adenopathy  Does not bruise/bleed easily  Psychiatric/Behavioral: Negative for agitation, behavioral problems, confusion, hallucinations, sleep disturbance and suicidal ideas  The patient is not nervous/anxious         Past Medical History:     Past Medical History:   Diagnosis Date    Hyperlipidemia       Past Surgical History:     Past Surgical History:   Procedure Laterality Date    EGD      WISDOM TOOTH EXTRACTION        Family History:     Family History   Problem Relation Age of Onset    Hyperlipidemia Mother     Osteoporosis Mother     Thyroid disease Mother     Stroke Father       Social History:     Social History     Socioeconomic History    Marital status: /Civil Union     Spouse name: None    Number of children: None    Years of education: None    Highest education level: None   Occupational History    Occupation:    Tobacco Use    Smoking status: Current Every Day Smoker     Packs/day: 0 25     Years: 30 00     Pack years: 7 50     Start date: 1987    Smokeless tobacco: Never Used    Tobacco comment: cigarette smoker; smoking addiction   Vaping Use    Vaping Use: Former   Substance and Sexual Activity    Alcohol use: Yes     Comment: minimum alcohol consumption    Drug use: No    Sexual activity: Yes     Partners: Female     Comment: heterosexual   Other Topics Concern    None   Social History Narrative    None     Social Determinants of Health     Financial Resource Strain: Not on file   Food Insecurity: Not on file   Transportation Needs: Not on file   Physical Activity: Not on file   Stress: Not on file   Social Connections: Not on file   Intimate Partner Violence: Not on file   Housing Stability: Not on file      Current Medications:     Current Outpatient Medications   Medication Sig Dispense Refill    atorvastatin (LIPITOR) 20 mg tablet TAKE 1 TABLET BY MOUTH EVERY DAY 90 tablet 0    cetirizine (ZyrTEC) 10 mg tablet Take 10 mg by mouth daily      ibuprofen (MOTRIN) 200 mg tablet Take by mouth every 6 (six) hours as needed for mild pain       No current facility-administered medications for this visit  Allergies:     No Known Allergies   Physical Exam:     /76 (BP Location: Left arm, Patient Position: Sitting, Cuff Size: Standard)   Pulse 72   Ht 6' 0 25" (1 835 m)   Wt 94 kg (207 lb 3 2 oz)   SpO2 99%   BMI 27 91 kg/m²     Physical Exam  Vitals reviewed  Constitutional:       General: He is not in acute distress  Appearance: He is not ill-appearing or toxic-appearing  HENT:      Head: Normocephalic and atraumatic  Right Ear: Tympanic membrane, ear canal and external ear normal  There is no impacted cerumen  Left Ear: Tympanic membrane, ear canal and external ear normal  There is no impacted cerumen  Nose: Nose normal  No congestion or rhinorrhea  Mouth/Throat:      Mouth: Mucous membranes are moist       Pharynx: No oropharyngeal exudate or posterior oropharyngeal erythema  Eyes:      General: No scleral icterus  Right eye: No discharge  Left eye: No discharge  Extraocular Movements: Extraocular movements intact  Conjunctiva/sclera: Conjunctivae normal       Pupils: Pupils are equal, round, and reactive to light  Neck:      Vascular: No carotid bruit  Cardiovascular:      Rate and Rhythm: Normal rate and regular rhythm  Heart sounds: No murmur heard  Pulmonary:      Effort: Pulmonary effort is normal  No respiratory distress  Breath sounds: Normal breath sounds  No wheezing or rales     Abdominal: General: Abdomen is flat  Bowel sounds are normal  There is no distension  Palpations: Abdomen is soft  Tenderness: There is no abdominal tenderness  Hernia: No hernia is present  Musculoskeletal:         General: No tenderness or deformity  Cervical back: Neck supple  No muscular tenderness  Right lower leg: No edema  Left lower leg: No edema  Lymphadenopathy:      Cervical: No cervical adenopathy  Skin:     General: Skin is warm and dry  Findings: No rash  Neurological:      General: No focal deficit present  Mental Status: He is alert and oriented to person, place, and time  Cranial Nerves: No cranial nerve deficit  Sensory: No sensory deficit  Motor: No weakness        Coordination: Coordination normal       Gait: Gait normal    Psychiatric:         Mood and Affect: Mood normal          Behavior: Behavior normal         [facility], [facility] [address]

## 2022-06-24 NOTE — PATIENT INSTRUCTIONS
Wellness Visit for Adults   AMBULATORY CARE:   A wellness visit  is when you see your healthcare provider to get screened for health problems  Your healthcare provider will also give you advice on how to stay healthy  Write down your questions so you remember to ask them  Ask your healthcare provider how often you should have a wellness visit  What happens at a wellness visit:  Your healthcare provider will ask about your health, and your family history of health problems  This includes high blood pressure, heart disease, and cancer  He or she will ask if you have symptoms that concern you, if you smoke, and about your mood  You may also be asked about your intake of medicines, supplements, food, and alcohol  Any of the following may be done: Your weight  will be checked  Your height may also be checked so your body mass index (BMI) can be calculated  Your BMI shows if you are at a healthy weight  Your blood pressure  and heart rate will be checked  Your temperature may also be checked  Blood and urine tests  may be done  Blood tests may be done to check your cholesterol levels  Abnormal cholesterol levels increase your risk for heart disease and stroke  You may also need a blood or urine test to check for diabetes if you are at increased risk  Urine tests may be done to look for signs of an infection or kidney disease  A physical exam  includes checking your heartbeat and lungs with a stethoscope  Your healthcare provider may also check your skin to look for sun damage  Screening tests  may be recommended  A screening test is done to check for diseases that may not cause symptoms  The screening tests you may need depend on your age, gender, family history, and lifestyle habits  For example, colorectal screening may be recommended if you are 48years old or older  Screening tests you need if you are a woman:   A Pap smear  is used to screen for cervical cancer   Pap smears are usually done every 3 to 5 years depending on your age  You may need them more often if you have had abnormal Pap smear test results in the past  Ask your healthcare provider how often you should have a Pap smear  A mammogram  is an x-ray of your breasts to screen for breast cancer  Experts recommend mammograms every 2 years starting at age 48 years  You may need a mammogram at age 52 years or younger if you have an increased risk for breast cancer  Talk to your healthcare provider about when you should start having mammograms and how often you need them  Vaccines you may need:   Get an influenza vaccine  every year  The influenza vaccine protects you from the flu  Several types of viruses cause the flu  The viruses change over time, so new vaccines are made each year  Get a tetanus-diphtheria (Td) booster vaccine  every 10 years  This vaccine protects you against tetanus and diphtheria  Tetanus is a severe infection that may cause painful muscle spasms and lockjaw  Diphtheria is a severe bacterial infection that causes a thick covering in the back of your mouth and throat  Get a human papillomavirus (HPV) vaccine  if you are female and aged 23 to 32 or male 23 to 24 and never received it  This vaccine protects you from HPV infection  HPV is the most common infection spread by sexual contact  HPV may also cause vaginal, penile, and anal cancers  Get a pneumococcal vaccine  if you are aged 72 years or older  The pneumococcal vaccine is an injection given to protect you from pneumococcal disease  Pneumococcal disease is an infection caused by pneumococcal bacteria  The infection may cause pneumonia, meningitis, or an ear infection  Get a shingles vaccine  if you are 60 or older, even if you have had shingles before  The shingles vaccine is an injection to protect you from the varicella-zoster virus  This is the same virus that causes chickenpox   Shingles is a painful rash that develops in people who had chickenpox or have been exposed to the virus  How to eat healthy:  My Plate is a model for planning healthy meals  It shows the types and amounts of foods that should go on your plate  Fruits and vegetables make up about half of your plate, and grains and protein make up the other half  A serving of dairy is included on the side of your plate  The amount of calories and serving sizes you need depends on your age, gender, weight, and height  Examples of healthy foods are listed below:  Eat a variety of vegetables  such as dark green, red, and orange vegetables  You can also include canned vegetables low in sodium (salt) and frozen vegetables without added butter or sauces  Eat a variety of fresh fruits , canned fruit in 100% juice, frozen fruit, and dried fruit  Include whole grains  At least half of the grains you eat should be whole grains  Examples include whole-wheat bread, wheat pasta, brown rice, and whole-grain cereals such as oatmeal     Eat a variety of protein foods such as seafood (fish and shellfish), lean meat, and poultry without skin (turkey and chicken)  Examples of lean meats include pork leg, shoulder, or tenderloin, and beef round, sirloin, tenderloin, and extra lean ground beef  Other protein foods include eggs and egg substitutes, beans, peas, soy products, nuts, and seeds  Choose low-fat dairy products such as skim or 1% milk or low-fat yogurt, cheese, and cottage cheese  Limit unhealthy fats  such as butter, hard margarine, and shortening  Exercise:  Exercise at least 30 minutes per day on most days of the week  Some examples of exercise include walking, biking, dancing, and swimming  You can also fit in more physical activity by taking the stairs instead of the elevator or parking farther away from stores  Include muscle strengthening activities 2 days each week  Regular exercise provides many health benefits   It helps you manage your weight, and decreases your risk for type 2 diabetes, heart disease, stroke, and high blood pressure  Exercise can also help improve your mood  Ask your healthcare provider about the best exercise plan for you  General health and safety guidelines:   Do not smoke  Nicotine and other chemicals in cigarettes and cigars can cause lung damage  Ask your healthcare provider for information if you currently smoke and need help to quit  E-cigarettes or smokeless tobacco still contain nicotine  Talk to your healthcare provider before you use these products  Limit alcohol  A drink of alcohol is 12 ounces of beer, 5 ounces of wine, or 1½ ounces of liquor  Lose weight, if needed  Being overweight increases your risk of certain health conditions  These include heart disease, high blood pressure, type 2 diabetes, and certain types of cancer  Protect your skin  Do not sunbathe or use tanning beds  Use sunscreen with a SPF 15 or higher  Apply sunscreen at least 15 minutes before you go outside  Reapply sunscreen every 2 hours  Wear protective clothing, hats, and sunglasses when you are outside  Drive safely  Always wear your seatbelt  Make sure everyone in your car wears a seatbelt  A seatbelt can save your life if you are in an accident  Do not use your cell phone when you are driving  This could distract you and cause an accident  Pull over if you need to make a call or send a text message  Practice safe sex  Use latex condoms if are sexually active and have more than one partner  Your healthcare provider may recommend screening tests for sexually transmitted infections (STIs)  Wear helmets, lifejackets, and protective gear  Always wear a helmet when you ride a bike or motorcycle, go skiing, or play sports that could cause a head injury  Wear protective equipment when you play sports  Wear a lifejacket when you are on a boat or doing water sports      © Copyright [employer] 2022 Information is for End User's use only and may not be sold, redistributed or otherwise used for commercial purposes  All illustrations and images included in CareNotes® are the copyrighted property of A D A M [employer] Inc  or Woody Coronado  The above information is an  only  It is not intended as medical advice for individual conditions or treatments  Talk to your doctor, nurse or pharmacist before following any medical regimen to see if it is safe and effective for you

## 2022-09-13 DIAGNOSIS — E78.5 HYPERLIPIDEMIA, UNSPECIFIED HYPERLIPIDEMIA TYPE: ICD-10-CM

## 2022-09-13 RX ORDER — ATORVASTATIN CALCIUM 20 MG/1
TABLET, FILM COATED ORAL
Qty: 90 TABLET | Refills: 1 | Status: SHIPPED | OUTPATIENT
Start: 2022-09-13

## 2023-01-22 ENCOUNTER — HOSPITAL ENCOUNTER (EMERGENCY)
Facility: HOSPITAL | Age: 53
Discharge: HOME | End: 2023-01-22
Attending: EMERGENCY MEDICINE

## 2023-01-22 ENCOUNTER — APPOINTMENT (EMERGENCY)
Dept: RADIOLOGY | Facility: HOSPITAL | Age: 53
End: 2023-01-22

## 2023-01-22 VITALS
OXYGEN SATURATION: 99 % | DIASTOLIC BLOOD PRESSURE: 77 MMHG | TEMPERATURE: 97.8 F | RESPIRATION RATE: 18 BRPM | SYSTOLIC BLOOD PRESSURE: 127 MMHG | HEART RATE: 62 BPM

## 2023-01-22 DIAGNOSIS — M47.818 ARTHRITIS OF LEFT SACROILIAC JOINT: ICD-10-CM

## 2023-01-22 DIAGNOSIS — M25.562 LEFT KNEE PAIN: Primary | ICD-10-CM

## 2023-01-22 LAB
ALBUMIN SERPL BCP-MCNC: 3.6 G/DL (ref 3.5–5)
ALP SERPL-CCNC: 74 U/L (ref 46–116)
ALT SERPL W P-5'-P-CCNC: 47 U/L (ref 12–78)
ANION GAP SERPL CALCULATED.3IONS-SCNC: 9 MMOL/L (ref 4–13)
AST SERPL W P-5'-P-CCNC: 28 U/L (ref 5–45)
BASOPHILS # BLD AUTO: 0.05 THOUSANDS/ÂΜL (ref 0–0.1)
BASOPHILS NFR BLD AUTO: 0 % (ref 0–1)
BILIRUB SERPL-MCNC: 0.58 MG/DL (ref 0.2–1)
BUN SERPL-MCNC: 12 MG/DL (ref 5–25)
CALCIUM SERPL-MCNC: 9 MG/DL (ref 8.3–10.1)
CHLORIDE SERPL-SCNC: 107 MMOL/L (ref 96–108)
CO2 SERPL-SCNC: 26 MMOL/L (ref 21–32)
CREAT SERPL-MCNC: 0.95 MG/DL (ref 0.6–1.3)
D DIMER PPP FEU-MCNC: 0.4 UG/ML FEU
EOSINOPHIL # BLD AUTO: 0.02 THOUSAND/ÂΜL (ref 0–0.61)
EOSINOPHIL NFR BLD AUTO: 0 % (ref 0–6)
ERYTHROCYTE [DISTWIDTH] IN BLOOD BY AUTOMATED COUNT: 12.3 % (ref 11.6–15.1)
GFR SERPL CREATININE-BSD FRML MDRD: 91 ML/MIN/1.73SQ M
GLUCOSE SERPL-MCNC: 98 MG/DL (ref 65–140)
HCT VFR BLD AUTO: 43.7 % (ref 36.5–49.3)
HGB BLD-MCNC: 14.6 G/DL (ref 12–17)
IMM GRANULOCYTES # BLD AUTO: 0.03 THOUSAND/UL (ref 0–0.2)
IMM GRANULOCYTES NFR BLD AUTO: 0 % (ref 0–2)
LYMPHOCYTES # BLD AUTO: 1.96 THOUSANDS/ÂΜL (ref 0.6–4.47)
LYMPHOCYTES NFR BLD AUTO: 16 % (ref 14–44)
MCH RBC QN AUTO: 28.2 PG (ref 26.8–34.3)
MCHC RBC AUTO-ENTMCNC: 33.4 G/DL (ref 31.4–37.4)
MCV RBC AUTO: 84 FL (ref 82–98)
MONOCYTES # BLD AUTO: 0.72 THOUSAND/ÂΜL (ref 0.17–1.22)
MONOCYTES NFR BLD AUTO: 6 % (ref 4–12)
NEUTROPHILS # BLD AUTO: 9.34 THOUSANDS/ÂΜL (ref 1.85–7.62)
NEUTS SEG NFR BLD AUTO: 78 % (ref 43–75)
NRBC BLD AUTO-RTO: 0 /100 WBCS
PLATELET # BLD AUTO: 230 THOUSANDS/UL (ref 149–390)
PMV BLD AUTO: 9.7 FL (ref 8.9–12.7)
POTASSIUM SERPL-SCNC: 4.1 MMOL/L (ref 3.5–5.3)
PROT SERPL-MCNC: 7.2 G/DL (ref 6.4–8.4)
RBC # BLD AUTO: 5.18 MILLION/UL (ref 3.88–5.62)
SODIUM SERPL-SCNC: 142 MMOL/L (ref 135–147)
WBC # BLD AUTO: 12.12 THOUSAND/UL (ref 4.31–10.16)

## 2023-01-22 RX ORDER — METHOCARBAMOL 500 MG/1
500 TABLET, FILM COATED ORAL ONCE
Status: COMPLETED | OUTPATIENT
Start: 2023-01-22 | End: 2023-01-22

## 2023-01-22 RX ORDER — KETOROLAC TROMETHAMINE 30 MG/ML
15 INJECTION, SOLUTION INTRAMUSCULAR; INTRAVENOUS ONCE
Status: COMPLETED | OUTPATIENT
Start: 2023-01-22 | End: 2023-01-22

## 2023-01-22 RX ORDER — METHOCARBAMOL 500 MG/1
500 TABLET, FILM COATED ORAL 2 TIMES DAILY
Qty: 20 TABLET | Refills: 0 | Status: SHIPPED | OUTPATIENT
Start: 2023-01-22 | End: 2023-01-30 | Stop reason: SDUPTHER

## 2023-01-22 RX ADMIN — KETOROLAC TROMETHAMINE 15 MG: 30 INJECTION, SOLUTION INTRAMUSCULAR at 18:08

## 2023-01-22 RX ADMIN — METHOCARBAMOL 500 MG: 500 TABLET ORAL at 19:51

## 2023-01-22 NOTE — Clinical Note
Huber Rizzo was seen and treated in our emergency department on 1/22/2023  Diagnosis:     Miko Santos       He may return on this date: If you have any questions or concerns, please don't hesitate to call        Laury Levy MD    ______________________________           _______________          _______________  Hospital Representative                              Date                                Time

## 2023-01-29 NOTE — ED PROVIDER NOTES
History  Chief Complaint   Patient presents with   • Knee Pain     Pt c/o constant aching of left knee pain 2 weeks ago  Pt also c/o back pain that started this morning  16-year-old male presented to emergency department for evaluation of knee pain  Patient has had 2 weeks of constant aching left knee pain, radiates up into his leg and back, no known injury, no numbness weakness or tingling  No fevers or chills  No redness or swelling of the joints  Prior to Admission Medications   Prescriptions Last Dose Informant Patient Reported? Taking?   atorvastatin (LIPITOR) 20 mg tablet   No No   Sig: TAKE 1 TABLET BY MOUTH EVERY DAY   cetirizine (ZyrTEC) 10 mg tablet   Yes No   Sig: Take 10 mg by mouth daily   ibuprofen (MOTRIN) 200 mg tablet   Yes No   Sig: Take by mouth every 6 (six) hours as needed for mild pain      Facility-Administered Medications: None       Past Medical History:   Diagnosis Date   • Hyperlipidemia        Past Surgical History:   Procedure Laterality Date   • EGD     • WISDOM TOOTH EXTRACTION         Family History   Problem Relation Age of Onset   • Hyperlipidemia Mother    • Osteoporosis Mother    • Thyroid disease Mother    • Stroke Father      I have reviewed and agree with the history as documented  E-Cigarette/Vaping   • E-Cigarette Use Current Some Day User    • Comments used less than 1 week      E-Cigarette/Vaping Substances   • Nicotine No    • THC No    • CBD No    • Flavoring No    • Other No    • Unknown No      Social History     Tobacco Use   • Smoking status: Every Day     Packs/day: 0 25     Years: 33 00     Pack years: 8 25     Types: Cigarettes     Start date: 25   • Smokeless tobacco: Never   Vaping Use   • Vaping Use: Some days   Substance Use Topics   • Alcohol use: Yes     Comment: minimum alcohol consumption   • Drug use: No       Review of Systems   Constitutional: Negative for appetite change, chills, fatigue and fever     HENT: Negative for sneezing and sore throat  Eyes: Negative for visual disturbance  Respiratory: Negative for cough, choking, chest tightness, shortness of breath and wheezing  Cardiovascular: Negative for chest pain and palpitations  Gastrointestinal: Negative for abdominal pain, constipation, diarrhea, nausea and vomiting  Genitourinary: Negative for difficulty urinating and dysuria  Musculoskeletal: Positive for arthralgias and back pain  Neurological: Negative for dizziness, weakness, light-headedness, numbness and headaches  All other systems reviewed and are negative  Physical Exam  Physical Exam  Vitals and nursing note reviewed  Constitutional:       General: He is not in acute distress  Appearance: He is well-developed  He is not diaphoretic  HENT:      Head: Normocephalic and atraumatic  Eyes:      Pupils: Pupils are equal, round, and reactive to light  Neck:      Vascular: No JVD  Trachea: No tracheal deviation  Cardiovascular:      Rate and Rhythm: Normal rate and regular rhythm  Heart sounds: Normal heart sounds  No murmur heard  No friction rub  No gallop  Pulmonary:      Effort: Pulmonary effort is normal  No respiratory distress  Breath sounds: Normal breath sounds  No wheezing or rales  Abdominal:      General: Bowel sounds are normal  There is no distension  Palpations: Abdomen is soft  Tenderness: There is no abdominal tenderness  There is no guarding or rebound  Musculoskeletal:      Comments: Patient has left hip and knee tenderness, particularly over the left SI joint  Neurovascularly intact distally  Skin:     General: Skin is warm and dry  Coloration: Skin is not pale  Neurological:      Mental Status: He is alert and oriented to person, place, and time  Cranial Nerves: No cranial nerve deficit  Motor: No abnormal muscle tone     Psychiatric:         Behavior: Behavior normal          Vital Signs  ED Triage Vitals   Temperature Pulse Respirations Blood Pressure SpO2   01/22/23 1637 01/22/23 1637 01/22/23 1637 01/22/23 1639 01/22/23 1637   97 8 °F (36 6 °C) 71 18 150/100 97 %      Temp Source Heart Rate Source Patient Position - Orthostatic VS BP Location FiO2 (%)   01/22/23 1637 01/22/23 1637 01/22/23 1639 01/22/23 1639 --   Temporal Monitor Sitting Left arm       Pain Score       01/22/23 1808       2           Vitals:    01/22/23 1639 01/22/23 1815 01/22/23 1900 01/22/23 1930   BP: 150/100 116/73 118/78 127/77   Pulse:  66 64 62   Patient Position - Orthostatic VS: Sitting Lying Lying Lying         Visual Acuity      ED Medications  Medications   ketorolac (TORADOL) injection 15 mg (15 mg Intravenous Given 1/22/23 1808)   methocarbamol (ROBAXIN) tablet 500 mg (500 mg Oral Given 1/22/23 1951)       Diagnostic Studies  Results Reviewed     Procedure Component Value Units Date/Time    Comprehensive metabolic panel [[id number]] Collected: 01/22/23 1807    Lab Status: Final result Specimen: Blood from Arm, Right Updated: 01/22/23 1832     Sodium 142 mmol/L      Potassium 4 1 mmol/L      Chloride 107 mmol/L      CO2 26 mmol/L      ANION GAP 9 mmol/L      BUN 12 mg/dL      Creatinine 0 95 mg/dL      Glucose 98 mg/dL      Calcium 9 0 mg/dL      AST 28 U/L      ALT 47 U/L      Alkaline Phosphatase 74 U/L      Total Protein 7 2 g/dL      Albumin 3 6 g/dL      Total Bilirubin 0 58 mg/dL      eGFR 91 ml/min/1 73sq m     Narrative:      Bret guidelines for Chronic Kidney Disease (CKD):   •  Stage 1 with normal or high GFR (GFR > 90 mL/min/1 73 square meters)  •  Stage 2 Mild CKD (GFR = 60-89 mL/min/1 73 square meters)  •  Stage 3A Moderate CKD (GFR = 45-59 mL/min/1 73 square meters)  •  Stage 3B Moderate CKD (GFR = 30-44 mL/min/1 73 square meters)  •  Stage 4 Severe CKD (GFR = 15-29 mL/min/1 73 square meters)  •  Stage 5 End Stage CKD (GFR <15 mL/min/1 73 square meters)  Note: GFR calculation is accurate only with a steady state creatinine    D-dimer, quantitative [[id number]]  (Normal) Collected: 01/22/23 1807    Lab Status: Final result Specimen: Blood from Arm, Right Updated: 01/22/23 1829     D-Dimer, Quant 0 40 ug/ml FEU     Narrative: In the evaluation for possible pulmonary embolism, in the appropriate (Well's Score of 4 or less) patient, the age adjusted d-dimer cutoff for this patient can be calculated as:    Age x 0 01 (in ug/mL) for Age-adjusted D-dimer exclusion threshold for a patient over 50 years  CBC and differential [[id number]]  (Abnormal) Collected: 01/22/23 1807    Lab Status: Final result Specimen: Blood from Arm, Right Updated: 01/22/23 1814     WBC 12 12 Thousand/uL      RBC 5 18 Million/uL      Hemoglobin 14 6 g/dL      Hematocrit 43 7 %      MCV 84 fL      MCH 28 2 pg      MCHC 33 4 g/dL      RDW 12 3 %      MPV 9 7 fL      Platelets 719 Thousands/uL      nRBC 0 /100 WBCs      Neutrophils Relative 78 %      Immat GRANS % 0 %      Lymphocytes Relative 16 %      Monocytes Relative 6 %      Eosinophils Relative 0 %      Basophils Relative 0 %      Neutrophils Absolute 9 34 Thousands/µL      Immature Grans Absolute 0 03 Thousand/uL      Lymphocytes Absolute 1 96 Thousands/µL      Monocytes Absolute 0 72 Thousand/µL      Eosinophils Absolute 0 02 Thousand/µL      Basophils Absolute 0 05 Thousands/µL                  XR knee 4+ vw left injury   Final Result by Ioana Palma MD (01/23 3710)      No acute osseous abnormality  Workstation performed: [id number]         XR hip/pelv 2-3 vws left if performed   Final Result by Ioana Palma MD (01/23 7040)      No acute osseous abnormality              Workstation performed: [id number]                    Procedures  Procedures         ED Course                               SBIRT 22yo+    Flowsheet Row Most Recent Value   SBIRT (25 yo +)    In order to provide better care to our patients, we are screening all of our patients for alcohol and drug use  Would it be okay to ask you these screening questions? No Filed at: 01/22/2023 1816                    Medical Decision Making  68-year-old male with left hip and knee pain, likely component of sacroiliitis  Check x-rays, give NSAIDs/muscle relaxers, refer to PCP/Ortho  Arthritis of left sacroiliac joint: complicated acute illness or injury  Left knee pain: complicated acute illness or injury  Amount and/or Complexity of Data Reviewed  Labs: ordered  Radiology: ordered  Risk  Prescription drug management  Disposition  Final diagnoses:   Left knee pain   Arthritis of left sacroiliac joint     Time reflects when diagnosis was documented in both MDM as applicable and the Disposition within this note     Time User Action Codes Description Comment    1/22/2023  7:42 PM Shabnam Manuel [M25 [id number]] Left knee pain     1/22/2023  7:43 PM Chasity [address] [place] [address] [C24 [id number]] Arthritis of left sacroiliac joint       ED Disposition     ED Disposition   Discharge    Condition   Stable    Date/Time   Sun Jan 22, 2023  7:44 PM    Comment   Evelin Settler discharge to home/self care  Follow-up Information     Follow up With Specialties Details Why Contact Info    Isaías Marte DO Internal Medicine   [address] [address]  [phone]            Discharge Medication List as of 1/22/2023  7:44 PM      START taking these medications    Details   methocarbamol (ROBAXIN) 500 mg tablet Take 1 tablet (500 mg total) by mouth 2 (two) times a day, Starting Sun 1/22/2023, Normal         CONTINUE these medications which have NOT CHANGED    Details   atorvastatin (LIPITOR) 20 mg tablet TAKE 1 TABLET BY MOUTH EVERY DAY, Normal      cetirizine (ZyrTEC) 10 mg tablet Take 10 mg by mouth daily, Historical Med      ibuprofen (MOTRIN) 200 mg tablet Take by mouth every 6 (six) hours as needed for mild pain, Historical Med             No discharge procedures on file      PDMP Review     None ED Provider  Electronically Signed by           Merrill Singh MD  01/28/23 2051

## 2023-01-30 ENCOUNTER — OFFICE VISIT (OUTPATIENT)
Dept: INTERNAL MEDICINE CLINIC | Facility: CLINIC | Age: 53
End: 2023-01-30

## 2023-01-30 VITALS
HEIGHT: 75 IN | RESPIRATION RATE: 20 BRPM | BODY MASS INDEX: 25.74 KG/M2 | DIASTOLIC BLOOD PRESSURE: 70 MMHG | HEART RATE: 100 BPM | TEMPERATURE: 97.7 F | SYSTOLIC BLOOD PRESSURE: 150 MMHG | OXYGEN SATURATION: 98 % | WEIGHT: 207 LBS

## 2023-01-30 DIAGNOSIS — M25.552 LEFT HIP PAIN: Primary | ICD-10-CM

## 2023-01-30 DIAGNOSIS — M79.605 LEFT LEG PAIN: ICD-10-CM

## 2023-01-30 RX ORDER — METHOCARBAMOL 750 MG/1
750 TABLET, FILM COATED ORAL 2 TIMES DAILY PRN
Qty: 60 TABLET | Refills: 0 | Status: SHIPPED | OUTPATIENT
Start: 2023-01-30

## 2023-01-30 RX ORDER — MELOXICAM 15 MG/1
15 TABLET ORAL DAILY
Qty: 30 TABLET | Refills: 2 | Status: SHIPPED | OUTPATIENT
Start: 2023-01-30

## 2023-01-30 RX ORDER — METHYLPREDNISOLONE 4 MG/1
TABLET ORAL
Qty: 21 EACH | Refills: 0 | Status: SHIPPED | OUTPATIENT
Start: 2023-01-30

## 2023-01-30 NOTE — PROGRESS NOTES
Name: Geri Nolasco      : 1970      MRN: [id number]  Encounter Provider: Zelda Gill DO  Encounter Date: 2023   Encounter department: [facility]  [address]     1  Left hip pain  2  Left leg pain    Believe that he has nerve impingement coming from his back  Anti-inflammatories and muscle relaxers prn  Continue seeing chiropractor  Avoid strenuous activity  If getting worse or not significantly improving after 6 weeks then re-evaluation is recommended  Can consider MRI if not going in right direction  - meloxicam (Mobic) 15 mg tablet; Take 1 tablet (15 mg total) by mouth daily  Dispense: 30 tablet; Refill: 2  - methylPREDNISolone 4 MG tablet therapy pack; Use as directed on package  Dispense: 21 each; Refill: 0  - methocarbamol (ROBAXIN) 750 mg tablet; Take 1 tablet (750 mg total) by mouth 2 (two) times a day as needed for muscle spasms  Dispense: 60 tablet; Refill: 0     BMI Counseling: Body mass index is 25 87 kg/m²  The BMI is above normal  Exercise recommendations include exercising 3-5 times per week  Rationale for BMI follow-up plan is due to patient being overweight or obese  Depression Screening and Follow-up Plan: Patient was screened for depression during today's encounter  They screened negative with a PHQ-2 score of 2  Subjective     Went to ED to left hip and left leg pain  Does also get some pain on left side of his back  Had x-rays which were negative  No injury  Felt like he slept on it wrong  Has been pain on and off for 1 week  Sometimes after sitting for awhile he will start to feel it and have to get up and walk around  Minimal degenerative changes in the back  Seeing chiropractor who feels pain is coming from his back  Does get tingling sometimes  Review of Systems   Constitutional: Negative  Respiratory: Negative  Cardiovascular: Negative  Gastrointestinal: Negative      Musculoskeletal: Positive for arthralgias and back pain  Objective     /70 (BP Location: Left arm, Patient Position: Sitting, Cuff Size: Standard)   Pulse 100   Temp 97 7 °F (36 5 °C) (Temporal)   Resp 20   Ht 6' 3" (1 905 m)   Wt 93 9 kg (207 lb)   SpO2 98%   BMI 25 87 kg/m²     Physical Exam  Constitutional:       General: He is not in acute distress  Appearance: He is not ill-appearing  Cardiovascular:      Rate and Rhythm: Normal rate and regular rhythm  Heart sounds: No murmur heard  Pulmonary:      Effort: Pulmonary effort is normal  No respiratory distress  Breath sounds: No wheezing  Musculoskeletal:         General: Deformity (lumbar paraspinal hypertonicity) present  No swelling or tenderness  Right lower leg: No edema  Left lower leg: No edema  Neurological:      Mental Status: He is alert  Cranial Nerves: No cranial nerve deficit  Motor: No weakness        Gait: Gait normal        Kleber Coto,  Admitted to r/o covid 19  supportive rx    Being followed by Dr Cordero/Alisha

## 2023-05-12 DIAGNOSIS — M25.552 LEFT HIP PAIN: ICD-10-CM

## 2023-05-12 DIAGNOSIS — M79.605 LEFT LEG PAIN: ICD-10-CM

## 2023-05-12 RX ORDER — MELOXICAM 15 MG/1
15 TABLET ORAL DAILY
Qty: 30 TABLET | Refills: 2 | Status: SHIPPED | OUTPATIENT
Start: 2023-05-12

## 2023-06-26 ENCOUNTER — OFFICE VISIT (OUTPATIENT)
Age: 53
End: 2023-06-26
Payer: COMMERCIAL

## 2023-06-26 VITALS
SYSTOLIC BLOOD PRESSURE: 118 MMHG | DIASTOLIC BLOOD PRESSURE: 64 MMHG | HEART RATE: 103 BPM | BODY MASS INDEX: 25.91 KG/M2 | RESPIRATION RATE: 18 BRPM | OXYGEN SATURATION: 98 % | HEIGHT: 75 IN | WEIGHT: 208.4 LBS | TEMPERATURE: 97.8 F

## 2023-06-26 DIAGNOSIS — E03.8 SUBCLINICAL HYPOTHYROIDISM: ICD-10-CM

## 2023-06-26 DIAGNOSIS — E78.49 OTHER HYPERLIPIDEMIA: ICD-10-CM

## 2023-06-26 DIAGNOSIS — M25.50 POLYARTHRALGIA: ICD-10-CM

## 2023-06-26 DIAGNOSIS — Z00.00 ADULT GENERAL MEDICAL EXAMINATION: Primary | ICD-10-CM

## 2023-06-26 DIAGNOSIS — Z12.5 SCREENING FOR PROSTATE CANCER: ICD-10-CM

## 2023-06-26 PROCEDURE — 99396 PREV VISIT EST AGE 40-64: CPT | Performed by: INTERNAL MEDICINE

## 2023-06-26 NOTE — PROGRESS NOTES
[facility] [place]    NAME: Marisela Reece  AGE: 46 y o  SEX: male  : 1970     DATE: 2023     Assessment and Plan:     Problem List Items Addressed This Visit        Endocrine    Subclinical hypothyroidism    Relevant Orders    TSH, 3rd generation with Free T4 reflex       Other    Other hyperlipidemia    Relevant Orders    Lipid Panel with Direct LDL reflex   Other Visit Diagnoses     Adult general medical examination    -  Primary    Relevant Orders    CBC    Basic metabolic panel    Screening for prostate cancer        Relevant Orders    PSA, Total Screen    Polyarthralgia        Relevant Orders    Lyme Total Antibody Profile with reflex to WB        Immunizations and preventive care screenings were discussed with patient today  Appropriate education was printed on patient's after visit summary  Counseling:  Alcohol/drug use: discussed moderation in alcohol intake, the recommendations for healthy alcohol use, and avoidance of illicit drug use  Dental Health: discussed importance of regular tooth brushing, flossing, and dental visits  Injury prevention: discussed safety/seat belts, safety helmets, smoke detectors, carbon dioxide detectors, and smoking near bedding or upholstery  Sexual health: discussed sexually transmitted diseases, partner selection, use of condoms, avoidance of unintended pregnancy, and contraceptive alternatives  · Exercise: the importance of regular exercise/physical activity was discussed  Recommend exercise 3-5 times per week for at least 30 minutes  Depression Screening and Follow-up Plan: Patient was screened for depression during today's encounter  They screened negative with a PHQ-2 score of 0  Tobacco Cessation Counseling: Tobacco cessation counseling was provided   The patient is sincerely urged to quit consumption of tobacco  He is not ready to quit tobacco        Return in about 1 year (around 6/26/2024) for Annual Physical      Chief Complaint:     Chief Complaint   Patient presents with   • Annual Exam      History of Present Illness:     Adult Annual Physical   Patient here for a comprehensive physical exam  The patient reports no significant concerns  Some mild aches and pains at times he has noticed with age  Never got blood work done that was ordered last year  Depression Screening  PHQ-2/9 Depression Screening    Little interest or pleasure in doing things: 0 - not at all  Feeling down, depressed, or hopeless: 0 - not at all  PHQ-2 Score: 0  PHQ-2 Interpretation: Negative depression screen       General Health  · Sleep: sleeps well  · Hearing: decreased - bilateral   · Vision: no vision problems  · Dental: regular dental visits  Review of Systems:     Review of Systems   Constitutional: Negative for appetite change, chills, fatigue and fever  HENT: Positive for hearing loss and tinnitus  Negative for congestion, postnasal drip, rhinorrhea, sore throat and trouble swallowing  Eyes: Negative for pain, discharge, redness and visual disturbance  Respiratory: Negative for cough, chest tightness, shortness of breath and wheezing  Cardiovascular: Negative for chest pain, palpitations and leg swelling  Gastrointestinal: Negative for abdominal distention, abdominal pain, blood in stool, constipation, diarrhea, nausea and vomiting  Endocrine: Negative for cold intolerance, heat intolerance, polydipsia, polyphagia and polyuria  Genitourinary: Negative for difficulty urinating, dysuria, frequency, hematuria and urgency  Musculoskeletal: Negative for arthralgias, back pain, gait problem, joint swelling, myalgias, neck pain and neck stiffness  Skin: Negative for color change and rash  Neurological: Negative for dizziness, tremors, seizures, syncope, speech difficulty, weakness, light-headedness, numbness and headaches     Hematological: Negative for adenopathy  Does not bruise/bleed easily  Psychiatric/Behavioral: Negative for agitation, behavioral problems, confusion, hallucinations, sleep disturbance and suicidal ideas  The patient is not nervous/anxious         Past Medical History:     Past Medical History:   Diagnosis Date   • Hyperlipidemia       Past Surgical History:     Past Surgical History:   Procedure Laterality Date   • EGD     • WISDOM TOOTH EXTRACTION        Family History:     Family History   Problem Relation Age of Onset   • Hyperlipidemia Mother    • Osteoporosis Mother    • Thyroid disease Mother    • Stroke Father    • ALS Father       Social History:     Social History     Socioeconomic History   • Marital status: /Civil Union     Spouse name: None   • Number of children: None   • Years of education: None   • Highest education level: None   Occupational History   • Occupation:    Tobacco Use   • Smoking status: Every Day     Packs/day: 0 50     Years: 37 00     Total pack years: 18 50     Types: Cigarettes     Start date: 1987   • Smokeless tobacco: Never   Vaping Use   • Vaping Use: Some days   Substance and Sexual Activity   • Alcohol use: Yes     Comment: minimum alcohol consumption   • Drug use: No   • Sexual activity: Yes     Partners: Female     Comment: heterosexual   Other Topics Concern   • None   Social History Narrative   • None     Social Determinants of Health     Financial Resource Strain: Not on file   Food Insecurity: Not on file   Transportation Needs: Not on file   Physical Activity: Insufficiently Active (1/15/2021)    Exercise Vital Sign    • Days of Exercise per Week: 3 days    • Minutes of Exercise per Session: 30 min   Stress: No Stress Concern Present (1/15/2021)    [address]    • Feeling of Stress : Not at all   Social Connections: Not on file   Intimate Partner Violence: Not on file   Housing Stability: Not on file      Current "Medications:     Current Outpatient Medications   Medication Sig Dispense Refill   • atorvastatin (LIPITOR) 20 mg tablet Take 1 tablet (20 mg total) by mouth daily 90 tablet 1   • cetirizine (ZyrTEC) 10 mg tablet Take 10 mg by mouth daily     • meloxicam (MOBIC) 15 mg tablet TAKE 1 TABLET (15 MG TOTAL) BY MOUTH DAILY  30 tablet 2   • methocarbamol (ROBAXIN) 750 mg tablet Take 1 tablet (750 mg total) by mouth 2 (two) times a day as needed for muscle spasms 60 tablet 0     No current facility-administered medications for this visit  Allergies:     No Known Allergies      Physical Exam:     /64 (BP Location: Left arm, Patient Position: Sitting, Cuff Size: Large)   Pulse 103   Temp 97 8 °F (36 6 °C) (Tympanic)   Resp 18   Ht 6' 3\" (1 905 m)   Wt 94 5 kg (208 lb 6 4 oz)   SpO2 98%   BMI 26 05 kg/m²     Physical Exam  Vitals reviewed  Constitutional:       General: He is not in acute distress  Appearance: He is well-developed  He is not diaphoretic  HENT:      Right Ear: Tympanic membrane, ear canal and external ear normal  There is no impacted cerumen  Left Ear: Tympanic membrane, ear canal and external ear normal  There is no impacted cerumen  Mouth/Throat:      Mouth: Mucous membranes are moist       Pharynx: No oropharyngeal exudate or posterior oropharyngeal erythema  Eyes:      General: No scleral icterus  Right eye: No discharge  Left eye: No discharge  Conjunctiva/sclera: Conjunctivae normal    Neck:      Thyroid: No thyromegaly  Vascular: No JVD  Cardiovascular:      Rate and Rhythm: Normal rate and regular rhythm  Heart sounds: Normal heart sounds  No murmur heard  Pulmonary:      Effort: Pulmonary effort is normal  No respiratory distress  Breath sounds: Normal breath sounds  No wheezing or rales  Abdominal:      General: Bowel sounds are normal  There is no distension  Palpations: Abdomen is soft  Tenderness:  There is no " abdominal tenderness  Musculoskeletal:      Cervical back: Neck supple  Right lower leg: No edema  Left lower leg: No edema  Lymphadenopathy:      Cervical: No cervical adenopathy  Skin:     General: Skin is warm and dry  Neurological:      Mental Status: He is alert     Psychiatric:         Mood and Affect: Mood normal          Behavior: Behavior normal         Kleber Coto DO  [address]

## 2023-06-26 NOTE — PATIENT INSTRUCTIONS
Wellness Visit for Adults   AMBULATORY CARE:   A wellness visit  is when you see your healthcare provider to get screened for health problems  Your healthcare provider will also give you advice on how to stay healthy  Write down your questions so you remember to ask them  Ask your healthcare provider how often you should have a wellness visit  What happens at a wellness visit:  Your healthcare provider will ask about your health, and your family history of health problems  This includes high blood pressure, heart disease, and cancer  He or she will ask if you have symptoms that concern you, if you smoke, and about your mood  You may also be asked about your intake of medicines, supplements, food, and alcohol  Any of the following may be done: Your weight  will be checked  Your height may also be checked so your body mass index (BMI) can be calculated  Your BMI shows if you are at a healthy weight  Your blood pressure  and heart rate will be checked  Your temperature may also be checked  Blood and urine tests  may be done  Blood tests may be done to check your cholesterol levels  Abnormal cholesterol levels increase your risk for heart disease and stroke  You may also need a blood or urine test to check for diabetes if you are at increased risk  Urine tests may be done to look for signs of an infection or kidney disease  A physical exam  includes checking your heartbeat and lungs with a stethoscope  Your healthcare provider may also check your skin to look for sun damage  Screening tests  may be recommended  A screening test is done to check for diseases that may not cause symptoms  The screening tests you may need depend on your age, gender, family history, and lifestyle habits  For example, colorectal screening may be recommended if you are 48years old or older  Screening tests you need if you are a woman:   A Pap smear  is used to screen for cervical cancer   Pap smears are usually done every 3 to 5 years depending on your age  You may need them more often if you have had abnormal Pap smear test results in the past  Ask your healthcare provider how often you should have a Pap smear  A mammogram  is an x-ray of your breasts to screen for breast cancer  Experts recommend mammograms every 2 years starting at age 48 years  You may need a mammogram at age 52 years or younger if you have an increased risk for breast cancer  Talk to your healthcare provider about when you should start having mammograms and how often you need them  Vaccines you may need:   Get an influenza vaccine  every year  The influenza vaccine protects you from the flu  Several types of viruses cause the flu  The viruses change over time, so new vaccines are made each year  Get a tetanus-diphtheria (Td) booster vaccine  every 10 years  This vaccine protects you against tetanus and diphtheria  Tetanus is a severe infection that may cause painful muscle spasms and lockjaw  Diphtheria is a severe bacterial infection that causes a thick covering in the back of your mouth and throat  Get a human papillomavirus (HPV) vaccine  if you are female and aged 23 to 32 or male 23 to 24 and never received it  This vaccine protects you from HPV infection  HPV is the most common infection spread by sexual contact  HPV may also cause vaginal, penile, and anal cancers  Get a pneumococcal vaccine  if you are aged 72 years or older  The pneumococcal vaccine is an injection given to protect you from pneumococcal disease  Pneumococcal disease is an infection caused by pneumococcal bacteria  The infection may cause pneumonia, meningitis, or an ear infection  Get a shingles vaccine  if you are 60 or older, even if you have had shingles before  The shingles vaccine is an injection to protect you from the varicella-zoster virus  This is the same virus that causes chickenpox   Shingles is a painful rash that develops in people who had chickenpox or have been exposed to the virus  How to eat healthy:  My Plate is a model for planning healthy meals  It shows the types and amounts of foods that should go on your plate  Fruits and vegetables make up about half of your plate, and grains and protein make up the other half  A serving of dairy is included on the side of your plate  The amount of calories and serving sizes you need depends on your age, gender, weight, and height  Examples of healthy foods are listed below:  Eat a variety of vegetables  such as dark green, red, and orange vegetables  You can also include canned vegetables low in sodium (salt) and frozen vegetables without added butter or sauces  Eat a variety of fresh fruits , canned fruit in 100% juice, frozen fruit, and dried fruit  Include whole grains  At least half of the grains you eat should be whole grains  Examples include whole-wheat bread, wheat pasta, brown rice, and whole-grain cereals such as oatmeal     Eat a variety of protein foods such as seafood (fish and shellfish), lean meat, and poultry without skin (turkey and chicken)  Examples of lean meats include pork leg, shoulder, or tenderloin, and beef round, sirloin, tenderloin, and extra lean ground beef  Other protein foods include eggs and egg substitutes, beans, peas, soy products, nuts, and seeds  Choose low-fat dairy products such as skim or 1% milk or low-fat yogurt, cheese, and cottage cheese  Limit unhealthy fats  such as butter, hard margarine, and shortening  Exercise:  Exercise at least 30 minutes per day on most days of the week  Some examples of exercise include walking, biking, dancing, and swimming  You can also fit in more physical activity by taking the stairs instead of the elevator or parking farther away from stores  Include muscle strengthening activities 2 days each week  Regular exercise provides many health benefits   It helps you manage your weight, and decreases your risk for type 2 diabetes, heart disease, stroke, and high blood pressure  Exercise can also help improve your mood  Ask your healthcare provider about the best exercise plan for you  General health and safety guidelines:   Do not smoke  Nicotine and other chemicals in cigarettes and cigars can cause lung damage  Ask your healthcare provider for information if you currently smoke and need help to quit  E-cigarettes or smokeless tobacco still contain nicotine  Talk to your healthcare provider before you use these products  Limit alcohol  A drink of alcohol is 12 ounces of beer, 5 ounces of wine, or 1½ ounces of liquor  Lose weight, if needed  Being overweight increases your risk of certain health conditions  These include heart disease, high blood pressure, type 2 diabetes, and certain types of cancer  Protect your skin  Do not sunbathe or use tanning beds  Use sunscreen with a SPF 15 or higher  Apply sunscreen at least 15 minutes before you go outside  Reapply sunscreen every 2 hours  Wear protective clothing, hats, and sunglasses when you are outside  Drive safely  Always wear your seatbelt  Make sure everyone in your car wears a seatbelt  A seatbelt can save your life if you are in an accident  Do not use your cell phone when you are driving  This could distract you and cause an accident  Pull over if you need to make a call or send a text message  Practice safe sex  Use latex condoms if are sexually active and have more than one partner  Your healthcare provider may recommend screening tests for sexually transmitted infections (STIs)  Wear helmets, lifejackets, and protective gear  Always wear a helmet when you ride a bike or motorcycle, go skiing, or play sports that could cause a head injury  Wear protective equipment when you play sports  Wear a lifejacket when you are on a boat or doing water sports      © Copyright [employer]anda [employer] 2022 Information is for End User's use only and may not be sold, redistributed or otherwise used for commercial purposes  The above information is an  only  It is not intended as medical advice for individual conditions or treatments  Talk to your doctor, nurse or pharmacist before following any medical regimen to see if it is safe and effective for you

## 2023-07-06 ENCOUNTER — APPOINTMENT (OUTPATIENT)
Age: 53
End: 2023-07-06
Payer: COMMERCIAL

## 2023-07-06 DIAGNOSIS — Z12.5 SCREENING FOR PROSTATE CANCER: ICD-10-CM

## 2023-07-06 DIAGNOSIS — M25.50 POLYARTHRALGIA: ICD-10-CM

## 2023-07-06 DIAGNOSIS — Z00.00 ADULT GENERAL MEDICAL EXAMINATION: ICD-10-CM

## 2023-07-06 DIAGNOSIS — E78.49 OTHER HYPERLIPIDEMIA: ICD-10-CM

## 2023-07-06 DIAGNOSIS — E03.8 SUBCLINICAL HYPOTHYROIDISM: ICD-10-CM

## 2023-07-06 LAB
ANION GAP SERPL CALCULATED.3IONS-SCNC: 3 MMOL/L
B BURGDOR IGG+IGM SER-ACNC: <0.2 AI
BUN SERPL-MCNC: 13 MG/DL (ref 5–25)
CALCIUM SERPL-MCNC: 9.4 MG/DL (ref 8.3–10.1)
CHLORIDE SERPL-SCNC: 109 MMOL/L (ref 96–108)
CHOLEST SERPL-MCNC: 151 MG/DL
CO2 SERPL-SCNC: 27 MMOL/L (ref 21–32)
CREAT SERPL-MCNC: 1.13 MG/DL (ref 0.6–1.3)
ERYTHROCYTE [DISTWIDTH] IN BLOOD BY AUTOMATED COUNT: 12.6 % (ref 11.6–15.1)
GFR SERPL CREATININE-BSD FRML MDRD: 73 ML/MIN/1.73SQ M
GLUCOSE P FAST SERPL-MCNC: 104 MG/DL (ref 65–99)
HCT VFR BLD AUTO: 45.3 % (ref 36.5–49.3)
HDLC SERPL-MCNC: 43 MG/DL
HGB BLD-MCNC: 15.2 G/DL (ref 12–17)
LDLC SERPL CALC-MCNC: 85 MG/DL (ref 0–100)
MCH RBC QN AUTO: 28.7 PG (ref 26.8–34.3)
MCHC RBC AUTO-ENTMCNC: 33.6 G/DL (ref 31.4–37.4)
MCV RBC AUTO: 86 FL (ref 82–98)
PLATELET # BLD AUTO: 236 THOUSANDS/UL (ref 149–390)
PMV BLD AUTO: 10.9 FL (ref 8.9–12.7)
POTASSIUM SERPL-SCNC: 4 MMOL/L (ref 3.5–5.3)
PSA SERPL-MCNC: 2 NG/ML (ref 0–4)
RBC # BLD AUTO: 5.3 MILLION/UL (ref 3.88–5.62)
SODIUM SERPL-SCNC: 139 MMOL/L (ref 135–147)
T4 FREE SERPL-MCNC: 0.72 NG/DL (ref 0.61–1.12)
TRIGL SERPL-MCNC: 116 MG/DL
TSH SERPL DL<=0.05 MIU/L-ACNC: 4.84 UIU/ML (ref 0.45–4.5)
WBC # BLD AUTO: 8.92 THOUSAND/UL (ref 4.31–10.16)

## 2023-07-06 PROCEDURE — 84443 ASSAY THYROID STIM HORMONE: CPT

## 2023-07-06 PROCEDURE — 80061 LIPID PANEL: CPT

## 2023-07-06 PROCEDURE — 84439 ASSAY OF FREE THYROXINE: CPT

## 2023-07-06 PROCEDURE — G0103 PSA SCREENING: HCPCS

## 2023-07-06 PROCEDURE — 85027 COMPLETE CBC AUTOMATED: CPT

## 2023-07-06 PROCEDURE — 86618 LYME DISEASE ANTIBODY: CPT

## 2023-07-06 PROCEDURE — 36415 COLL VENOUS BLD VENIPUNCTURE: CPT

## 2023-07-06 PROCEDURE — 80048 BASIC METABOLIC PNL TOTAL CA: CPT

## 2023-08-09 ENCOUNTER — TELEPHONE (OUTPATIENT)
Age: 53
End: 2023-08-09

## 2023-10-19 DIAGNOSIS — E78.5 HYPERLIPIDEMIA, UNSPECIFIED HYPERLIPIDEMIA TYPE: ICD-10-CM

## 2023-10-19 RX ORDER — ATORVASTATIN CALCIUM 20 MG/1
20 TABLET, FILM COATED ORAL DAILY
Qty: 90 TABLET | Refills: 1 | Status: SHIPPED | OUTPATIENT
Start: 2023-10-19

## 2024-04-28 DIAGNOSIS — E78.5 HYPERLIPIDEMIA, UNSPECIFIED HYPERLIPIDEMIA TYPE: ICD-10-CM

## 2024-04-28 RX ORDER — ATORVASTATIN CALCIUM 20 MG/1
20 TABLET, FILM COATED ORAL DAILY
Qty: 90 TABLET | Refills: 1 | Status: SHIPPED | OUTPATIENT
Start: 2024-04-28

## 2024-05-21 DIAGNOSIS — E78.5 HYPERLIPIDEMIA, UNSPECIFIED HYPERLIPIDEMIA TYPE: ICD-10-CM

## 2024-05-22 RX ORDER — ATORVASTATIN CALCIUM 20 MG/1
20 TABLET, FILM COATED ORAL DAILY
Qty: 90 TABLET | Refills: 1 | Status: SHIPPED | OUTPATIENT
Start: 2024-05-22

## 2024-06-13 DIAGNOSIS — Z00.6 ENCOUNTER FOR EXAMINATION FOR NORMAL COMPARISON OR CONTROL IN CLINICAL RESEARCH PROGRAM: ICD-10-CM

## 2024-06-27 ENCOUNTER — APPOINTMENT (OUTPATIENT)
Age: 54
End: 2024-06-27
Payer: COMMERCIAL

## 2024-06-27 ENCOUNTER — OFFICE VISIT (OUTPATIENT)
Age: 54
End: 2024-06-27
Payer: COMMERCIAL

## 2024-06-27 VITALS
HEIGHT: 72 IN | TEMPERATURE: 95.9 F | BODY MASS INDEX: 28.66 KG/M2 | HEART RATE: 60 BPM | SYSTOLIC BLOOD PRESSURE: 104 MMHG | WEIGHT: 211.6 LBS | DIASTOLIC BLOOD PRESSURE: 62 MMHG | OXYGEN SATURATION: 99 %

## 2024-06-27 DIAGNOSIS — Z00.6 ENCOUNTER FOR EXAMINATION FOR NORMAL COMPARISON OR CONTROL IN CLINICAL RESEARCH PROGRAM: ICD-10-CM

## 2024-06-27 DIAGNOSIS — E78.49 OTHER HYPERLIPIDEMIA: ICD-10-CM

## 2024-06-27 DIAGNOSIS — Z12.5 SCREENING FOR PROSTATE CANCER: ICD-10-CM

## 2024-06-27 DIAGNOSIS — Z86.010 PERSONAL HISTORY OF COLONIC POLYPS: ICD-10-CM

## 2024-06-27 DIAGNOSIS — Z00.00 ADULT GENERAL MEDICAL EXAMINATION: Primary | ICD-10-CM
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
## 2024-06-27 LAB
ANION GAP SERPL CALCULATED.3IONS-SCNC: 8 MMOL/L (ref 4–13)
BASOPHILS # BLD AUTO: 0.07 THOUSANDS/ÂΜL (ref 0–0.1)
BASOPHILS NFR BLD AUTO: 1 % (ref 0–1)
BUN SERPL-MCNC: 14 MG/DL (ref 5–25)
CALCIUM SERPL-MCNC: 9.4 MG/DL (ref 8.4–10.2)
CHLORIDE SERPL-SCNC: 104 MMOL/L (ref 96–108)
CHOLEST SERPL-MCNC: 127 MG/DL
CO2 SERPL-SCNC: 27 MMOL/L (ref 21–32)
CREAT SERPL-MCNC: 1.03 MG/DL (ref 0.6–1.3)
EOSINOPHIL # BLD AUTO: 0.19 THOUSAND/ÂΜL (ref 0–0.61)
EOSINOPHIL NFR BLD AUTO: 3 % (ref 0–6)
ERYTHROCYTE [DISTWIDTH] IN BLOOD BY AUTOMATED COUNT: 12.3 % (ref 11.6–15.1)
GFR SERPL CREATININE-BSD FRML MDRD: 81 ML/MIN/1.73SQ M
GLUCOSE P FAST SERPL-MCNC: 93 MG/DL (ref 65–99)
HCT VFR BLD AUTO: 44.5 % (ref 36.5–49.3)
HDLC SERPL-MCNC: 39 MG/DL
HGB BLD-MCNC: 14.4 G/DL (ref 12–17)
IMM GRANULOCYTES # BLD AUTO: 0.02 THOUSAND/UL (ref 0–0.2)
IMM GRANULOCYTES NFR BLD AUTO: 0 % (ref 0–2)
LDLC SERPL CALC-MCNC: 76 MG/DL (ref 0–100)
LYMPHOCYTES # BLD AUTO: 2.09 THOUSANDS/ÂΜL (ref 0.6–4.47)
LYMPHOCYTES NFR BLD AUTO: 28 % (ref 14–44)
MCH RBC QN AUTO: 27.9 PG (ref 26.8–34.3)
MCHC RBC AUTO-ENTMCNC: 32.4 G/DL (ref 31.4–37.4)
MCV RBC AUTO: 86 FL (ref 82–98)
MONOCYTES # BLD AUTO: 0.66 THOUSAND/ÂΜL (ref 0.17–1.22)
MONOCYTES NFR BLD AUTO: 9 % (ref 4–12)
NEUTROPHILS # BLD AUTO: 4.37 THOUSANDS/ÂΜL (ref 1.85–7.62)
NEUTS SEG NFR BLD AUTO: 59 % (ref 43–75)
NRBC BLD AUTO-RTO: 0 /100 WBCS
PLATELET # BLD AUTO: 242 THOUSANDS/UL (ref 149–390)
PMV BLD AUTO: 10.2 FL (ref 8.9–12.7)
POTASSIUM SERPL-SCNC: 4.3 MMOL/L (ref 3.5–5.3)
RBC # BLD AUTO: 5.17 MILLION/UL (ref 3.88–5.62)
SODIUM SERPL-SCNC: 139 MMOL/L (ref 135–147)
TRIGL SERPL-MCNC: 59 MG/DL
WBC # BLD AUTO: 7.4 THOUSAND/UL (ref 4.31–10.16)

## 2024-06-27 PROCEDURE — 80048 BASIC METABOLIC PNL TOTAL CA: CPT

## 2024-06-27 PROCEDURE — 80061 LIPID PANEL: CPT

## 2024-06-27 PROCEDURE — 85025 COMPLETE CBC W/AUTO DIFF WBC: CPT

## 2024-06-27 PROCEDURE — 36415 COLL VENOUS BLD VENIPUNCTURE: CPT

## 2024-06-27 PROCEDURE — 99396 PREV VISIT EST AGE 40-64: CPT | Performed by: INTERNAL MEDICINE

## 2024-06-27 NOTE — PROGRESS NOTES
Adult Annual Physical  Name: Tree Westbrook      : 1970      MRN: [id number]  Encounter Provider: Kleber Coto DO  Encounter Date: 2024   Encounter department: [facility] [place]    Assessment & Plan   Assessment & Plan  Adult general medical examination    Doing well at this time. Reviewed chronic medical conditions, cancer screenings, labs he is due for. Regular exercise encouraged. Well balanced diet encouraged.    Personal history of colonic polyps    Due for surveillance colonoscopy. Referral provided.    Other hyperlipidemia    Continue atorvastatin and check updated lipid panel    Screening for prostate cancer    Due for PSA on or after 2024    Orders Placed This Encounter   Procedures    PSA, Total Screen    TSH, 3rd generation with Free T4 reflex    Lipid Panel with Direct LDL reflex    Basic metabolic panel    CBC and differential    Ambulatory Referral to Gastroenterology      Immunizations and preventive care screenings were discussed with patient today. Appropriate education was printed on patient's after visit summary.    Counseling:  Alcohol/drug use: discussed moderation in alcohol intake, the recommendations for healthy alcohol use, and avoidance of illicit drug use.  Dental Health: discussed importance of regular tooth brushing, flossing, and dental visits.  Injury prevention: discussed safety/seat belts, safety helmets, smoke detectors, carbon dioxide detectors, and smoking near bedding or upholstery.  Sexual health: discussed sexually transmitted diseases, partner selection, use of condoms, avoidance of unintended pregnancy, and contraceptive alternatives.  Exercise: the importance of regular exercise/physical activity was discussed. Recommend exercise 3-5 times per week for at least 30 minutes.       Depression Screening and Follow-up Plan: Patient was screened for depression during today's encounter. They screened negative with a PHQ-2 score of  "2.    Tobacco Cessation Counseling: Tobacco cessation counseling was provided. The patient is sincerely urged to quit consumption of tobacco. He is not ready to quit tobacco.       History of Present Illness     Adult Annual Physical:  Patient presents for annual physical. Overall he has been doing well. Left shoulder has been bothering him for years and has been more bothersome lately..     Diet and Physical Activity:  - Diet/Nutrition: well balanced diet.  - Exercise: 1-2 times a week on average. biking    Depression Screening:  - PHQ-2 Score: 2    General Health:  - Sleep: sleeps well.  - Hearing: normal hearing bilateral ears.  - Vision: goes for regular eye exams.  - Dental: regular dental visits.     Health:  - History of STDs: no.   - Urinary symptoms: urinary frequency.     Advanced Care Planning:  - Has an advanced directive?: no    - Has a durable medical POA?: no    - ACP document given to patient?: no      Review of Systems   Constitutional:  Negative for activity change, appetite change and fatigue.   Respiratory:  Negative for apnea, cough, chest tightness, shortness of breath and wheezing.    Cardiovascular:  Negative for chest pain, palpitations and leg swelling.   Gastrointestinal:  Negative for abdominal distention, abdominal pain, blood in stool, constipation, diarrhea, nausea and vomiting.   Musculoskeletal:  Positive for arthralgias. Negative for back pain, gait problem, joint swelling and myalgias.   Skin:  Negative for rash and wound.   Neurological:  Negative for dizziness, weakness, light-headedness, numbness and headaches.   Psychiatric/Behavioral:  Negative for behavioral problems, confusion, hallucinations, sleep disturbance and suicidal ideas. The patient is not nervous/anxious.      Objective     /62   Pulse 60   Temp (!) 95.9 °F (35.5 °C) (Tympanic)   Ht 5' 11.5\" (1.816 m)   Wt 96 kg (211 lb 9.6 oz)   SpO2 99%   BMI 29.10 kg/m²     Physical Exam  Constitutional:       " General: He is not in acute distress.     Appearance: He is not ill-appearing.   HENT:      Right Ear: Tympanic membrane, ear canal and external ear normal. There is no impacted cerumen.      Left Ear: Tympanic membrane, ear canal and external ear normal. There is no impacted cerumen.   Eyes:      General:         Right eye: No discharge.         Left eye: No discharge.      Extraocular Movements: Extraocular movements intact.      Conjunctiva/sclera: Conjunctivae normal.      Pupils: Pupils are equal, round, and reactive to light.   Cardiovascular:      Rate and Rhythm: Normal rate and regular rhythm.      Heart sounds: No murmur heard.  Pulmonary:      Effort: Pulmonary effort is normal. No respiratory distress.      Breath sounds: No wheezing.   Abdominal:      General: Bowel sounds are normal. There is no distension.      Tenderness: There is no abdominal tenderness.   Musculoskeletal:         General: No tenderness, deformity or signs of injury.      Right lower leg: No edema.      Left lower leg: No edema.   Neurological:      Mental Status: He is alert. Mental status is at baseline.   Psychiatric:         Mood and Affect: Mood normal.         Behavior: Behavior normal.       Kleber WintersDO [place]

## 2024-07-12 LAB
APOB+LDLR+PCSK9 GENE MUT ANL BLD/T: NOT DETECTED
BRCA1+BRCA2 DEL+DUP + FULL MUT ANL BLD/T: NOT DETECTED
MLH1+MSH2+MSH6+PMS2 GN DEL+DUP+FUL M: NOT DETECTED

## 2024-08-16 DIAGNOSIS — M25.552 LEFT HIP PAIN: ICD-10-CM

## 2024-08-16 DIAGNOSIS — M79.605 LEFT LEG PAIN: ICD-10-CM

## 2024-08-16 RX ORDER — METHOCARBAMOL 750 MG/1
750 TABLET, FILM COATED ORAL 2 TIMES DAILY PRN
Qty: 60 TABLET | Refills: 0 | Status: SHIPPED | OUTPATIENT
Start: 2024-08-16

## 2024-09-06 ENCOUNTER — OFFICE VISIT (OUTPATIENT)
Dept: OBGYN CLINIC | Facility: CLINIC | Age: 54
End: 2024-09-06
Payer: COMMERCIAL

## 2024-09-06 ENCOUNTER — APPOINTMENT (OUTPATIENT)
Dept: RADIOLOGY | Facility: CLINIC | Age: 54
End: 2024-09-06
Payer: COMMERCIAL

## 2024-09-06 VITALS
SYSTOLIC BLOOD PRESSURE: 111 MMHG | WEIGHT: 220 LBS | DIASTOLIC BLOOD PRESSURE: 74 MMHG | OXYGEN SATURATION: 96 % | HEIGHT: 72 IN | HEART RATE: 78 BPM | BODY MASS INDEX: 29.8 KG/M2

## 2024-09-06 DIAGNOSIS — M75.42 SUBACROMIAL IMPINGEMENT OF LEFT SHOULDER: ICD-10-CM

## 2024-09-06 DIAGNOSIS — S46.012A ROTATOR CUFF STRAIN, LEFT, INITIAL ENCOUNTER: ICD-10-CM

## 2024-09-06 DIAGNOSIS — M50.30 DEGENERATIVE CERVICAL DISC: ICD-10-CM

## 2024-09-06 DIAGNOSIS — M47.812 FACET ARTHROPATHY, CERVICAL: ICD-10-CM

## 2024-09-06 DIAGNOSIS — M25.312 SHOULDER INSTABILITY, LEFT: ICD-10-CM

## 2024-09-06 DIAGNOSIS — G89.29 CHRONIC LEFT SHOULDER PAIN: ICD-10-CM

## 2024-09-06 DIAGNOSIS — M19.012 ARTHRITIS OF LEFT ACROMIOCLAVICULAR JOINT: Primary | ICD-10-CM

## 2024-09-06 DIAGNOSIS — M67.912 TENDINOPATHY OF LEFT ROTATOR CUFF: ICD-10-CM

## 2024-09-06 DIAGNOSIS — M25.512 CHRONIC LEFT SHOULDER PAIN: ICD-10-CM

## 2024-09-06 PROCEDURE — 20610 DRAIN/INJ JOINT/BURSA W/O US: CPT | Performed by: FAMILY MEDICINE

## 2024-09-06 PROCEDURE — 72040 X-RAY EXAM NECK SPINE 2-3 VW: CPT

## 2024-09-06 PROCEDURE — 99204 OFFICE O/P NEW MOD 45 MIN: CPT | Performed by: FAMILY MEDICINE

## 2024-09-06 PROCEDURE — 73030 X-RAY EXAM OF SHOULDER: CPT

## 2024-09-06 RX ORDER — TRIAMCINOLONE ACETONIDE 40 MG/ML
40 INJECTION, SUSPENSION INTRA-ARTICULAR; INTRAMUSCULAR
Status: COMPLETED | OUTPATIENT
Start: 2024-09-06 | End: 2024-09-06

## 2024-09-06 RX ORDER — BUPIVACAINE HYDROCHLORIDE 2.5 MG/ML
4 INJECTION, SOLUTION INFILTRATION; PERINEURAL
Status: COMPLETED | OUTPATIENT
Start: 2024-09-06 | End: 2024-09-06

## 2024-09-06 RX ORDER — BUPIVACAINE HYDROCHLORIDE 2.5 MG/ML
1 INJECTION, SOLUTION INFILTRATION; PERINEURAL
Status: COMPLETED | OUTPATIENT
Start: 2024-09-06 | End: 2024-09-06

## 2024-09-06 RX ADMIN — BUPIVACAINE HYDROCHLORIDE 1 ML: 2.5 INJECTION, SOLUTION INFILTRATION; PERINEURAL at 11:30

## 2024-09-06 RX ADMIN — BUPIVACAINE HYDROCHLORIDE 4 ML: 2.5 INJECTION, SOLUTION INFILTRATION; PERINEURAL at 11:30

## 2024-09-06 RX ADMIN — TRIAMCINOLONE ACETONIDE 40 MG: 40 INJECTION, SUSPENSION INTRA-ARTICULAR; INTRAMUSCULAR at 11:30

## 2024-09-06 NOTE — PROGRESS NOTES
Assessment/Plan:  Assessment & Plan   Diagnoses and all orders for this visit:    Arthritis of left acromioclavicular joint  -     Ambulatory Referral to Orthopedic Surgery  -     XR shoulder 2+ vw left; Future  -     Ambulatory Referral to Physical Therapy; Future    Shoulder instability, left  -     Ambulatory Referral to Physical Therapy; Future    Subacromial impingement of left shoulder  -     Ambulatory Referral to Physical Therapy; Future    Tendinopathy of left rotator cuff  -     Ambulatory Referral to Physical Therapy; Future  -     Large joint arthrocentesis: L subacromial bursa    Rotator cuff strain, left, initial encounter  -     Ambulatory Referral to Physical Therapy; Future    Degenerative cervical disc  -     XR spine cervical 2 or 3 vw injury; Future  -     Ambulatory Referral to Physical Therapy; Future    Facet arthropathy, cervical  -     Ambulatory Referral to Physical Therapy; Future      54-year-old right-hand-dominant male with left shoulder pain more than few years duration.  Discussed with patient physical exam, radiographs, impression, and plan.  X-rays cervical spine noted for multilevel disc space narrowing most pronounced C5-T1.  X-rays left shoulder are unremarkable for significant degenerative changes.  Imaging studies, clinical exam, and history suggest that symptoms in the shoulder may be due to combination of degenerative changes and rotator cuff tendinopathy.  I discussed treatment regimen of steroid injection, supplements, and formal therapy.  Surgery not warranted at this time.  I administered mixture 3 cc 0.25% bupivacaine and 1 cc Kenalog to the left shoulder subacromial space without complication.  Upon reassessment demonstrated improved range of motion with forward flexion and abduction.  He is to take turmeric, tart cherry, and glucosamine supplements.  He is to start physical therapy as soon as possible and do home exercises as directed.  He will follow-up as  needed.        Subjective:   Patient ID: Tree Westbrook is a 54 y.o. male.  Chief Complaint   Patient presents with    Left Shoulder - Clicking, Pain              54-year-old right-hand-dominant male presents evaluation of left shoulder pain more than few years duration.  He denies trauma or inciting event.  Pain described as gradual in onset, generalized to the shoulder, nonradiating, worse with elevating the arm above shoulder level, associated with limited range of motion and clicking, and improved with resting or changing position.  He also reports bothersome symptoms at night if he sleeps on his shoulder in awkward position.  He sometimes feels his shoulder pop out of place, and with shifting it pops back.  Symptoms fluctuate with going through a bothersome pain that may last for 1 week, and then flare improving and being without significant issue for few weeks.  He denies numbness or tingling.  He has been on regimen of meloxicam for quite some time for other MSK issues and states that when he takes meloxicam it does help with the symptoms.  He was given Rx for methocarbamol, however has been apprehensive with taking muscle relaxant.  He was referred for x-rays and referred to orthopedic care by primary care physician.    Shoulder Pain  This is a chronic problem. The current episode started more than 1 year ago. The problem occurs daily. The problem has been waxing and waning. Associated symptoms include arthralgias. Pertinent negatives include no joint swelling, numbness or weakness.           The following portions of the patient's history were reviewed and updated as appropriate: He  has a past medical history of Allergic, GERD (gastroesophageal reflux disease), HL (hearing loss), Hyperlipidemia, and Visual impairment.  He has No Known Allergies..    Review of Systems   Musculoskeletal:  Positive for arthralgias. Negative for joint swelling.   Neurological:  Negative for weakness and numbness.  "      Objective:  Vitals:    09/06/24 1120   BP: 111/74   Pulse: 78   SpO2: 96%   Weight: 99.8 kg (220 lb)   Height: 5' 11.5\" (1.816 m)      Back Exam     Comments:    Cervical spine  - Normal range of motion  - Negative axial load and  - Negative Spurling's  - Normal strength and sensation both upper extremities      Right Hand Exam     Muscle Strength   Wrist extension: 5/5   Wrist flexion: 5/5   : 5/5     Other   Sensation: normal  Pulse: present      Left Hand Exam     Muscle Strength   Wrist extension: 5/5   Wrist flexion: 5/5   :  5/5     Other   Sensation: normal  Pulse: present      Right Elbow Exam     Other   Sensation: normal      Left Elbow Exam     Tenderness   The patient is experiencing no tenderness.     Range of Motion   The patient has normal left elbow ROM.    Muscle Strength   The patient has normal left elbow strength (5/5 flexion and extension).    Other   Sensation: normal      Right Shoulder Exam     Muscle Strength   Abduction: 5/5     Other   Sensation: normal      Left Shoulder Exam     Tenderness   Left shoulder tenderness location: Anterior, lateral.    Range of Motion   Active abduction:  90   Forward flexion:  130   Internal rotation 0 degrees:  Lumbar     Muscle Strength   Abduction: 5/5   Internal rotation: 5/5   External rotation: 5/5   Supraspinatus: 5/5     Tests   Brown test: positive    Other   Sensation: normal     Comments:  Negative empty can          Strength/Myotome Testing     Left Wrist/Hand   Wrist extension: 5  Wrist flexion: 5    Right Wrist/Hand   Wrist extension: 5  Wrist flexion: 5      Physical Exam  Vitals and nursing note reviewed.   Constitutional:       Appearance: Normal appearance. He is well-developed. He is not ill-appearing or diaphoretic.   HENT:      Head: Normocephalic and atraumatic.      Right Ear: External ear normal.      Left Ear: External ear normal.   Eyes:      Conjunctiva/sclera: Conjunctivae normal.   Neck:      Trachea: No tracheal " "deviation.   Cardiovascular:      Rate and Rhythm: Normal rate.   Pulmonary:      Effort: Pulmonary effort is normal. No respiratory distress.   Abdominal:      General: There is no distension.   Musculoskeletal:         General: Tenderness present. No swelling.   Skin:     General: Skin is warm and dry.      Coloration: Skin is not jaundiced or pale.   Neurological:      Mental Status: He is alert and oriented to person, place, and time.   Psychiatric:         Mood and Affect: Mood normal.         Behavior: Behavior normal.         Thought Content: Thought content normal.         Judgment: Judgment normal.         I have personally reviewed pertinent films in PACS and my interpretation is  .  X-rays cervical spine noted for multilevel disc space narrowing most pronounced C5-T1.  X-rays left shoulder are unremarkable for significant degenerative changes.    Large joint arthrocentesis: L subacromial bursa  [facility] Protocol:  procedure performed by consultantConsent: Verbal consent obtained.  Risks and benefits: risks, benefits and alternatives were discussed  Consent given by: patient  Time out: Immediately prior to procedure a \"time out\" was called to verify the correct patient, procedure, equipment, support staff and site/side marked as required.  Patient understanding: patient states understanding of the procedure being performed  Patient consent: the patient's understanding of the procedure matches consent given  Procedure consent: procedure consent matches procedure scheduled  Relevant documents: relevant documents present and verified  Test results: test results available and properly labeled  Site marked: the operative site was marked  Radiology Images displayed and confirmed. If images not available, report reviewed: imaging studies available  Required items: required blood products, implants, devices, and special equipment available  Patient identity confirmed: verbally with patient  Supporting " "Documentation  Indications: pain   Procedure Details  Location: shoulder - L subacromial bursa  Preparation: Patient was prepped and draped in the usual sterile fashion  Needle gauge: 21G 2\"  Ultrasound guidance: no  Approach: lateral  Medications administered: 4 mL bupivacaine 0.25 %; 1 mL bupivacaine 0.25 %; 40 mg triamcinolone acetonide 40 mg/mL    Patient tolerance: patient tolerated the procedure well with no immediate complications  Dressing:  Sterile dressing applied            "

## 2024-09-06 NOTE — PATIENT INSTRUCTIONS
Over the counter vitamins:    - Turmeric vitamin at least 1000 mg daily    - Tart cherry vitamin at least 1000 mg daily    - Glucosamine-chondrointin 2-3 times daily    Physical Therapy and home exercises

## 2024-09-06 NOTE — LETTER
September 6, 2024     Kleber Coto DO  [address]  2nd Floor  [facility] [address]    Patient: Tree Westbrook   YOB: 1970   Date of Visit: 9/6/2024       Dear Dr. Coto:    Thank you for referring Tree Westbrook to me for evaluation. Below are my notes for this consultation.    If you have questions, please do not hesitate to call me. I look forward to following your patient along with you.         Sincerely,        Svetlana Enciso DO        CC: No Recipients    Svetlana Enciso DO  9/6/2024 12:30 PM  Sign when Signing Visit  Assessment/Plan:  Assessment & Plan  Diagnoses and all orders for this visit:    Arthritis of left acromioclavicular joint  -     Ambulatory Referral to Orthopedic Surgery  -     XR shoulder 2+ vw left; Future  -     Ambulatory Referral to Physical Therapy; Future    Shoulder instability, left  -     Ambulatory Referral to Physical Therapy; Future    Subacromial impingement of left shoulder  -     Ambulatory Referral to Physical Therapy; Future    Tendinopathy of left rotator cuff  -     Ambulatory Referral to Physical Therapy; Future  -     Large joint arthrocentesis: L subacromial bursa    Rotator cuff strain, left, initial encounter  -     Ambulatory Referral to Physical Therapy; Future    Degenerative cervical disc  -     XR spine cervical 2 or 3 vw injury; Future  -     Ambulatory Referral to Physical Therapy; Future    Facet arthropathy, cervical  -     Ambulatory Referral to Physical Therapy; Future      54-year-old right-hand-dominant male with left shoulder pain more than few years duration.  Discussed with patient physical exam, radiographs, impression, and plan.  X-rays cervical spine noted for multilevel disc space narrowing most pronounced C5-T1.  X-rays left shoulder are unremarkable for significant degenerative changes.  Imaging studies, clinical exam, and history suggest that symptoms in the shoulder may be due  to combination of degenerative changes and rotator cuff tendinopathy.  I discussed treatment regimen of steroid injection, supplements, and formal therapy.  Surgery not warranted at this time.  I administered mixture 3 cc 0.25% bupivacaine and 1 cc Kenalog to the left shoulder subacromial space without complication.  Upon reassessment demonstrated improved range of motion with forward flexion and abduction.  He is to take turmeric, tart cherry, and glucosamine supplements.  He is to start physical therapy as soon as possible and do home exercises as directed.  He will follow-up as needed.        Subjective:   Patient ID: Tree Westbrook is a 54 y.o. male.  Chief Complaint   Patient presents with   • Left Shoulder - Clicking, Pain              54-year-old right-hand-dominant male presents evaluation of left shoulder pain more than few years duration.  He denies trauma or inciting event.  Pain described as gradual in onset, generalized to the shoulder, nonradiating, worse with elevating the arm above shoulder level, associated with limited range of motion and clicking, and improved with resting or changing position.  He also reports bothersome symptoms at night if he sleeps on his shoulder in awkward position.  He sometimes feels his shoulder pop out of place, and with shifting it pops back.  Symptoms fluctuate with going through a bothersome pain that may last for 1 week, and then flare improving and being without significant issue for few weeks.  He denies numbness or tingling.  He has been on regimen of meloxicam for quite some time for other MSK issues and states that when he takes meloxicam it does help with the symptoms.  He was given Rx for methocarbamol, however has been apprehensive with taking muscle relaxant.  He was referred for x-rays and referred to orthopedic care by primary care physician.    Shoulder Pain  This is a chronic problem. The current episode started more than 1 year ago. The problem occurs  "daily. The problem has been waxing and waning. Associated symptoms include arthralgias. Pertinent negatives include no joint swelling, numbness or weakness.           The following portions of the patient's history were reviewed and updated as appropriate: He  has a past medical history of Allergic, GERD (gastroesophageal reflux disease), HL (hearing loss), Hyperlipidemia, and Visual impairment.  He has No Known Allergies..    Review of Systems   Musculoskeletal:  Positive for arthralgias. Negative for joint swelling.   Neurological:  Negative for weakness and numbness.       Objective:  Vitals:    09/06/24 1120   BP: 111/74   Pulse: 78   SpO2: 96%   Weight: 99.8 kg (220 lb)   Height: 5' 11.5\" (1.816 m)      Back Exam     Comments:    Cervical spine  - Normal range of motion  - Negative axial load and  - Negative Spurling's  - Normal strength and sensation both upper extremities      Right Hand Exam     Muscle Strength   Wrist extension: 5/5   Wrist flexion: 5/5   : 5/5     Other   Sensation: normal  Pulse: present      Left Hand Exam     Muscle Strength   Wrist extension: 5/5   Wrist flexion: 5/5   :  5/5     Other   Sensation: normal  Pulse: present      Right Elbow Exam     Other   Sensation: normal      Left Elbow Exam     Tenderness   The patient is experiencing no tenderness.     Range of Motion   The patient has normal left elbow ROM.    Muscle Strength   The patient has normal left elbow strength (5/5 flexion and extension).    Other   Sensation: normal      Right Shoulder Exam     Muscle Strength   Abduction: 5/5     Other   Sensation: normal      Left Shoulder Exam     Tenderness   Left shoulder tenderness location: Anterior, lateral.    Range of Motion   Active abduction:  90   Forward flexion:  130   Internal rotation 0 degrees:  Lumbar     Muscle Strength   Abduction: 5/5   Internal rotation: 5/5   External rotation: 5/5   Supraspinatus: 5/5     Tests   Brown test: positive    Other " "  Sensation: normal     Comments:  Negative empty can          Strength/Myotome Testing     Left Wrist/Hand   Wrist extension: 5  Wrist flexion: 5    Right Wrist/Hand   Wrist extension: 5  Wrist flexion: 5      Physical Exam  Vitals and nursing note reviewed.   Constitutional:       Appearance: Normal appearance. He is well-developed. He is not ill-appearing or diaphoretic.   HENT:      Head: Normocephalic and atraumatic.      Right Ear: External ear normal.      Left Ear: External ear normal.   Eyes:      Conjunctiva/sclera: Conjunctivae normal.   Neck:      Trachea: No tracheal deviation.   Cardiovascular:      Rate and Rhythm: Normal rate.   Pulmonary:      Effort: Pulmonary effort is normal. No respiratory distress.   Abdominal:      General: There is no distension.   Musculoskeletal:         General: Tenderness present. No swelling.   Skin:     General: Skin is warm and dry.      Coloration: Skin is not jaundiced or pale.   Neurological:      Mental Status: He is alert and oriented to person, place, and time.   Psychiatric:         Mood and Affect: Mood normal.         Behavior: Behavior normal.         Thought Content: Thought content normal.         Judgment: Judgment normal.         I have personally reviewed pertinent films in PACS and my interpretation is  .  X-rays cervical spine noted for multilevel disc space narrowing most pronounced C5-T1.  X-rays left shoulder are unremarkable for significant degenerative changes.    Large joint arthrocentesis: L subacromial bursa  Boyd Protocol:  procedure performed by consultantConsent: Verbal consent obtained.  Risks and benefits: risks, benefits and alternatives were discussed  Consent given by: patient  Time out: Immediately prior to procedure a \"time out\" was called to verify the correct patient, procedure, equipment, support staff and site/side marked as required.  Patient understanding: patient states understanding of the procedure being " "performed  Patient consent: the patient's understanding of the procedure matches consent given  Procedure consent: procedure consent matches procedure scheduled  Relevant documents: relevant documents present and verified  Test results: test results available and properly labeled  Site marked: the operative site was marked  Radiology Images displayed and confirmed. If images not available, report reviewed: imaging studies available  Required items: required blood products, implants, devices, and special equipment available  Patient identity confirmed: verbally with patient  Supporting Documentation  Indications: pain   Procedure Details  Location: shoulder - L subacromial bursa  Preparation: Patient was prepped and draped in the usual sterile fashion  Needle gauge: 21G 2\"  Ultrasound guidance: no  Approach: lateral  Medications administered: 4 mL bupivacaine 0.25 %; 1 mL bupivacaine 0.25 %; 40 mg triamcinolone acetonide 40 mg/mL    Patient tolerance: patient tolerated the procedure well with no immediate complications  Dressing:  Sterile dressing applied            "

## 2024-09-12 DIAGNOSIS — M25.552 LEFT HIP PAIN: ICD-10-CM

## 2024-09-12 DIAGNOSIS — M79.605 LEFT LEG PAIN: ICD-10-CM

## 2024-09-12 RX ORDER — METHOCARBAMOL 750 MG/1
750 TABLET, FILM COATED ORAL 2 TIMES DAILY PRN
Qty: 60 TABLET | Refills: 0 | Status: SHIPPED | OUTPATIENT
Start: 2024-09-12

## 2024-10-10 DIAGNOSIS — M25.552 LEFT HIP PAIN: ICD-10-CM

## 2024-10-10 DIAGNOSIS — M79.605 LEFT LEG PAIN: ICD-10-CM

## 2024-10-10 RX ORDER — MELOXICAM 15 MG/1
15 TABLET ORAL DAILY
Qty: 30 TABLET | Refills: 2 | Status: SHIPPED | OUTPATIENT
Start: 2024-10-10

## 2025-01-23 DIAGNOSIS — M25.552 LEFT HIP PAIN: ICD-10-CM

## 2025-01-23 DIAGNOSIS — M79.605 LEFT LEG PAIN: ICD-10-CM

## 2025-01-23 RX ORDER — MELOXICAM 15 MG/1
15 TABLET ORAL DAILY
Qty: 30 TABLET | Refills: 2 | Status: SHIPPED | OUTPATIENT
Start: 2025-01-23

## 2025-02-16 DIAGNOSIS — E78.5 HYPERLIPIDEMIA, UNSPECIFIED HYPERLIPIDEMIA TYPE: ICD-10-CM

## 2025-02-16 RX ORDER — ATORVASTATIN CALCIUM 20 MG/1
20 TABLET, FILM COATED ORAL DAILY
Qty: 90 TABLET | Refills: 1 | Status: SHIPPED | OUTPATIENT
Start: 2025-02-16

## 2025-04-30 DIAGNOSIS — M25.552 LEFT HIP PAIN: ICD-10-CM

## 2025-04-30 DIAGNOSIS — M79.605 LEFT LEG PAIN: ICD-10-CM

## 2025-04-30 RX ORDER — MELOXICAM 15 MG/1
15 TABLET ORAL DAILY
Qty: 30 TABLET | Refills: 1 | Status: SHIPPED | OUTPATIENT
Start: 2025-04-30

## 2025-06-30 ENCOUNTER — APPOINTMENT (OUTPATIENT)
Age: 55
End: 2025-06-30
Attending: INTERNAL MEDICINE
Payer: COMMERCIAL

## 2025-06-30 ENCOUNTER — OFFICE VISIT (OUTPATIENT)
Age: 55
End: 2025-06-30
Payer: COMMERCIAL

## 2025-06-30 VITALS
WEIGHT: 225 LBS | DIASTOLIC BLOOD PRESSURE: 74 MMHG | HEIGHT: 72 IN | RESPIRATION RATE: 18 BRPM | SYSTOLIC BLOOD PRESSURE: 116 MMHG | TEMPERATURE: 97.3 F | HEART RATE: 71 BPM | OXYGEN SATURATION: 97 % | BODY MASS INDEX: 30.48 KG/M2

## 2025-06-30 DIAGNOSIS — Z00.00 ADULT GENERAL MEDICAL EXAMINATION: Primary | ICD-10-CM

## 2025-06-30 DIAGNOSIS — E03.8 SUBCLINICAL HYPOTHYROIDISM: ICD-10-CM

## 2025-06-30 DIAGNOSIS — Z86.0109 PERSONAL HISTORY OF OTHER COLON POLYPS: ICD-10-CM

## 2025-06-30 DIAGNOSIS — Z12.5 SCREENING FOR PROSTATE CANCER: ICD-10-CM

## 2025-06-30 DIAGNOSIS — E78.49 OTHER HYPERLIPIDEMIA: ICD-10-CM

## 2025-06-30 DIAGNOSIS — M25.50 POLYARTHRALGIA: ICD-10-CM

## 2025-06-30 DIAGNOSIS — E03.8 SUBCLINICAL HYPOTHYROIDISM: Primary | ICD-10-CM

## 2025-06-30 LAB
ALBUMIN SERPL BCG-MCNC: 4.4 G/DL (ref 3.5–5)
ALP SERPL-CCNC: 72 U/L (ref 34–104)
ALT SERPL W P-5'-P-CCNC: 41 U/L (ref 7–52)
ANION GAP SERPL CALCULATED.3IONS-SCNC: 5 MMOL/L (ref 4–13)
AST SERPL W P-5'-P-CCNC: 32 U/L (ref 13–39)
BILIRUB SERPL-MCNC: 0.63 MG/DL (ref 0.2–1)
BUN SERPL-MCNC: 17 MG/DL (ref 5–25)
CALCIUM SERPL-MCNC: 9.3 MG/DL (ref 8.4–10.2)
CHLORIDE SERPL-SCNC: 107 MMOL/L (ref 96–108)
CHOLEST SERPL-MCNC: 141 MG/DL (ref ?–200)
CO2 SERPL-SCNC: 29 MMOL/L (ref 21–32)
CREAT SERPL-MCNC: 0.94 MG/DL (ref 0.6–1.3)
ERYTHROCYTE [DISTWIDTH] IN BLOOD BY AUTOMATED COUNT: 13 % (ref 11.6–15.1)
GFR SERPL CREATININE-BSD FRML MDRD: 90 ML/MIN/1.73SQ M
GLUCOSE P FAST SERPL-MCNC: 97 MG/DL (ref 65–99)
HCT VFR BLD AUTO: 44 % (ref 36.5–49.3)
HDLC SERPL-MCNC: 40 MG/DL
HGB BLD-MCNC: 14.9 G/DL (ref 12–17)
LDLC SERPL CALC-MCNC: 88 MG/DL (ref 0–100)
MCH RBC QN AUTO: 28.5 PG (ref 26.8–34.3)
MCHC RBC AUTO-ENTMCNC: 33.9 G/DL (ref 31.4–37.4)
MCV RBC AUTO: 84 FL (ref 82–98)
PLATELET # BLD AUTO: 228 THOUSANDS/UL (ref 149–390)
PMV BLD AUTO: 9.9 FL (ref 8.9–12.7)
POTASSIUM SERPL-SCNC: 4.3 MMOL/L (ref 3.5–5.3)
PROT SERPL-MCNC: 7.2 G/DL (ref 6.4–8.4)
PSA SERPL-MCNC: 2.46 NG/ML (ref 0–4)
RBC # BLD AUTO: 5.23 MILLION/UL (ref 3.88–5.62)
SODIUM SERPL-SCNC: 141 MMOL/L (ref 135–147)
T4 FREE SERPL-MCNC: 0.71 NG/DL (ref 0.61–1.12)
TRIGL SERPL-MCNC: 67 MG/DL (ref ?–150)
TSH SERPL DL<=0.05 MIU/L-ACNC: 5.55 UIU/ML (ref 0.45–4.5)
WBC # BLD AUTO: 9.31 THOUSAND/UL (ref 4.31–10.16)

## 2025-06-30 PROCEDURE — 80053 COMPREHEN METABOLIC PANEL: CPT

## 2025-06-30 PROCEDURE — 84443 ASSAY THYROID STIM HORMONE: CPT

## 2025-06-30 PROCEDURE — 36415 COLL VENOUS BLD VENIPUNCTURE: CPT

## 2025-06-30 PROCEDURE — 84439 ASSAY OF FREE THYROXINE: CPT

## 2025-06-30 PROCEDURE — 99396 PREV VISIT EST AGE 40-64: CPT | Performed by: INTERNAL MEDICINE

## 2025-06-30 PROCEDURE — 80061 LIPID PANEL: CPT

## 2025-06-30 PROCEDURE — 85027 COMPLETE CBC AUTOMATED: CPT

## 2025-06-30 PROCEDURE — 86618 LYME DISEASE ANTIBODY: CPT

## 2025-06-30 NOTE — PROGRESS NOTES
Adult Annual Physical  Name: Tree Westbrook      : 1970      MRN: [id number]  Encounter Provider: Kleber Coto DO  Encounter Date: 2025   Encounter department: [facility] [place]    :  Assessment & Plan  Adult general medical examination         Other hyperlipidemia    Check updated lipid panel. Continue statin.    Orders:  •  CBC; Future  •  Lipid Panel with Direct LDL reflex; Future  •  Comprehensive metabolic panel; Future    Personal history of other colon polyps    Due for surveillance colonoscopy    Orders:  •  Ambulatory referral to Gastroenterology; Future    Subclinical hypothyroidism    Orders:  •  TSH, 3rd generation with Free T4 reflex; Future    Screening for prostate cancer    Orders:  •  PSA, Total Screen; Future      Preventive Screenings:  - Diabetes Screening: orders placed  - Cholesterol Screening: screening not indicated and has hyperlipidemia   - Hepatitis C screening: screening up-to-date   - HIV screening: screening up-to-date   - Colon cancer screening: screening up-to-date   - Lung cancer screening: screening not indicated   - Prostate cancer screening: orders placed     Immunizations:  - Immunizations due: Prevnar 20 and Zoster (Shingrix)    Counseling/Anticipatory Guidance:  - Alcohol: discussed moderation in alcohol intake and recommendations for healthy alcohol use.   - Drug use: discussed harms of illicit drug use and how it can negatively impact mental/physical health.   - Tobacco use: discussed harms of tobacco use and management options for quitting.   - Dental health: discussed importance of regular tooth brushing, flossing, and dental visits.   - Sexual health: discussed sexually transmitted diseases, partner selection, use of condoms, avoidance of unintended pregnancy, and contraceptive alternatives.   - Diet: discussed recommendations for a healthy/well-balanced diet.   - Exercise: the importance of regular exercise/physical activity was  discussed. Recommend exercise 3-5 times per week for at least 30 minutes.   - Injury prevention: discussed safety/seat belts, safety helmets, smoke detectors, carbon monoxide detectors, and smoking near bedding or upholstery.       Depression Screening and Follow-up Plan: Patient was screened for depression during today's encounter. They screened negative with a PHQ-2 score of 0.      Tobacco Cessation Counseling: Tobacco cessation counseling was provided. The patient is sincerely urged to quit consumption of tobacco. He is not ready to quit tobacco.     History of Present Illness     Adult Annual Physical:  Patient presents for annual physical.     Diet and Physical Activity:  - Diet/Nutrition: no special diet.  - Exercise: no formal exercise and 1-2 times a week on average.    Depression Screening:  - PHQ-2 Score: 0    General Health:  - Sleep: 4-6 hours of sleep on average.  - Hearing: normal hearing right ear and decreased hearing left ear.  - Vision: most recent eye exam > 1 year ago and wears glasses and contacts.  - Dental: regular dental visits, brushes teeth twice daily and floss regularly.    /GYN Health:  - Follows with GYN: no.   - History of STDs: yes    Advanced Care Planning:  - Has an advanced directive?: no    - Has a durable medical POA?: no      Review of Systems   Constitutional:  Negative for appetite change, chills, fatigue and fever.   HENT:  Negative for congestion, hearing loss, postnasal drip, rhinorrhea, sore throat, tinnitus and trouble swallowing.    Eyes:  Negative for pain, discharge, redness and visual disturbance.   Respiratory:  Negative for cough, chest tightness, shortness of breath and wheezing.    Cardiovascular:  Negative for chest pain, palpitations and leg swelling.   Gastrointestinal:  Negative for abdominal distention, abdominal pain, blood in stool, constipation, diarrhea, nausea and vomiting.   Endocrine: Negative for cold intolerance, heat intolerance, polydipsia,  "polyphagia and polyuria.   Genitourinary:  Negative for difficulty urinating, dysuria, frequency, hematuria and urgency.   Musculoskeletal:  Negative for arthralgias, back pain, gait problem, joint swelling, myalgias, neck pain and neck stiffness.   Skin:  Negative for color change and rash.   Neurological:  Negative for dizziness, tremors, seizures, syncope, speech difficulty, weakness, light-headedness, numbness and headaches.   Hematological:  Negative for adenopathy. Does not bruise/bleed easily.   Psychiatric/Behavioral:  Negative for agitation, behavioral problems, confusion, hallucinations, sleep disturbance and suicidal ideas. The patient is not nervous/anxious.      Medical History Reviewed by provider this encounter:  Tobacco  Allergies  Meds  Problems  Med Hx  Surg Hx  Fam Hx  Soc   Hx        Objective   /74 (BP Location: Left arm, Patient Position: Sitting, Cuff Size: Large)   Pulse 71   Temp (!) 97.3 °F (36.3 °C) (Tympanic)   Resp 18   Ht 5' 11.5\" (1.816 m)   Wt 102 kg (225 lb)   SpO2 97%   BMI 30.94 kg/m²     Physical Exam  Constitutional:       General: He is not in acute distress.     Appearance: He is not ill-appearing.   HENT:      Right Ear: Tympanic membrane, ear canal and external ear normal. There is no impacted cerumen.      Left Ear: Tympanic membrane, ear canal and external ear normal. There is no impacted cerumen.     Cardiovascular:      Rate and Rhythm: Normal rate and regular rhythm.      Heart sounds: No murmur heard.  Pulmonary:      Effort: Pulmonary effort is normal. No respiratory distress.      Breath sounds: No wheezing.   Abdominal:      General: Bowel sounds are normal. There is no distension.      Tenderness: There is no abdominal tenderness.     Musculoskeletal:      Right lower leg: No edema.      Left lower leg: No edema.     Neurological:      Mental Status: He is alert.       Kleber Coto,    "

## 2025-06-30 NOTE — ASSESSMENT & PLAN NOTE
Check updated lipid panel. Continue statin.    Orders:  •  CBC; Future  •  Lipid Panel with Direct LDL reflex; Future  •  Comprehensive metabolic panel; Future

## 2025-06-30 NOTE — PATIENT INSTRUCTIONS
"Patient Education     Routine physical for adults   The Basics   Written by the doctors and editors at [facility]   What is a physical? -- A physical is a routine visit, or \"check-up,\" with your doctor. You might also hear it called a \"wellness visit\" or \"preventive visit.\"  During each visit, the doctor will:   Ask about your physical and mental health   Ask about your habits, behaviors, and lifestyle   Do an exam   Give you vaccines if needed   Talk to you about any medicines you take   Give advice about your health   Answer your questions  Getting regular check-ups is an important part of taking care of your health. It can help your doctor find and treat any problems you have. But it's also important for preventing health problems.  A routine physical is different from a \"sick visit.\" A sick visit is when you see a doctor because of a health concern or problem. Since physicals are scheduled ahead of time, you can think about what you want to ask the doctor.  How often should I get a physical? -- It depends on your age and health. In general, for people age 21 years and older:   If you are younger than 50 years, you might be able to get a physical every 3 years.   If you are 50 years or older, your doctor might recommend a physical every year.  If you have an ongoing health condition, like diabetes or high blood pressure, your doctor will probably want to see you more often.  What happens during a physical? -- In general, each visit will include:   Physical exam - The doctor or nurse will check your height, weight, heart rate, and blood pressure. They will also look at your eyes and ears. They will ask about how you are feeling and whether you have any symptoms that bother you.   Medicines - It's a good idea to bring a list of all the medicines you take to each doctor visit. Your doctor will talk to you about your medicines and answer any questions. Tell them if you are having any side effects that bother you. You " "should also tell them if you are having trouble paying for any of your medicines.   Habits and behaviors - This includes:   Your diet   Your exercise habits   Whether you smoke, drink alcohol, or use drugs   Whether you are sexually active   Whether you feel safe at home  Your doctor will talk to you about things you can do to improve your health and lower your risk of health problems. They will also offer help and support. For example, if you want to quit smoking, they can give you advice and might prescribe medicines. If you want to improve your diet or get more physical activity, they can help you with this, too.   Lab tests, if needed - The tests you get will depend on your age and situation. For example, your doctor might want to check your:   Cholesterol   Blood sugar   Iron level   Vaccines - The recommended vaccines will depend on your age, health, and what vaccines you already had. Vaccines are very important because they can prevent certain serious or deadly infections.   Discussion of screening - \"Screening\" means checking for diseases or other health problems before they cause symptoms. Your doctor can recommend screening based on your age, risk, and preferences. This might include tests to check for:   Cancer, such as breast, prostate, cervical, ovarian, colorectal, prostate, lung, or skin cancer   Sexually transmitted infections, such as chlamydia and gonorrhea   Mental health conditions like depression and anxiety  Your doctor will talk to you about the different types of screening tests. They can help you decide which screenings to have. They can also explain what the results might mean.   Answering questions - The physical is a good time to ask the doctor or nurse questions about your health. If needed, they can refer you to other doctors or specialists, too.  Adults older than 65 years often need other care, too. As you get older, your doctor will talk to you about:   How to prevent falling at " home   Hearing or vision tests   Memory testing   How to take your medicines safely   Making sure that you have the help and support you need at home  All topics are updated as new evidence becomes available and our peer review process is complete.  This topic retrieved from [employer] on: May 02, 2024.  Topic 116758 Version 1.0  Release: 32.4.3 - C32.122  © 2024 UpToDate, Inc. and/or its affiliates. All rights reserved.  Consumer Information Use and Disclaimer   Disclaimer: This generalized information is a limited summary of diagnosis, treatment, and/or medication information. It is not meant to be comprehensive and should be used as a tool to help the user understand and/or assess potential diagnostic and treatment options. It does NOT include all information about conditions, treatments, medications, side effects, or risks that may apply to a specific patient. It is not intended to be medical advice or a substitute for the medical advice, diagnosis, or treatment of a health care provider based on the health care provider's examination and assessment of a patient's specific and unique circumstances. Patients must speak with a health care provider for complete information about their health, medical questions, and treatment options, including any risks or benefits regarding use of medications. This information does not endorse any treatments or medications as safe, effective, or approved for treating a specific patient. UpToDate, Inc. and its affiliates disclaim any warranty or liability relating to this information or the use thereof.The use of this information is governed by the Terms of Use, available at https://www.[employer].com/en/know/clinical-effectiveness-terms. 2024© UpToDate, Inc. and its affiliates and/or licensors. All rights reserved.  Copyright   © 2024 UpToDate, Inc. and/or its affiliates. All rights reserved.

## 2025-07-01 ENCOUNTER — RESULTS FOLLOW-UP (OUTPATIENT)
Age: 55
End: 2025-07-01

## 2025-07-01 LAB — B BURGDOR IGG+IGM SER QL IA: NEGATIVE

## 2025-07-01 NOTE — TELEPHONE ENCOUNTER
----- Message from Kleber Coto DO sent at 7/1/2025  1:12 PM EDT -----  Lab work is stable overall. No evidence of lyme disease. Cholesterol is normal. Kidney and liver function is normal. Thyroid shows evidence of subclinical hypothyroidism which has been going on for   years. No treatment required for this and will continue to monitor thyroid function yearly.  ----- Message -----  From: Lab, Background User  Sent: 6/30/2025   4:14 PM EDT  To: Kleber Coto DO

## 2025-07-06 DIAGNOSIS — M79.605 LEFT LEG PAIN: ICD-10-CM

## 2025-07-06 DIAGNOSIS — M25.552 LEFT HIP PAIN: ICD-10-CM

## 2025-07-08 RX ORDER — MELOXICAM 15 MG/1
15 TABLET ORAL DAILY
Qty: 30 TABLET | Refills: 1 | Status: SHIPPED | OUTPATIENT
Start: 2025-07-08

## 2025-08-20 DIAGNOSIS — E78.5 HYPERLIPIDEMIA, UNSPECIFIED HYPERLIPIDEMIA TYPE: ICD-10-CM

## 2025-08-21 RX ORDER — ATORVASTATIN CALCIUM 20 MG/1
20 TABLET, FILM COATED ORAL DAILY
Qty: 90 TABLET | Refills: 1 | Status: SHIPPED | OUTPATIENT
Start: 2025-08-21